# Patient Record
Sex: MALE | Race: WHITE
[De-identification: names, ages, dates, MRNs, and addresses within clinical notes are randomized per-mention and may not be internally consistent; named-entity substitution may affect disease eponyms.]

---

## 2017-01-11 NOTE — HP
HISTORY AND PHYSICAL:

 

DATE OF ADMISSION/SURGERY:  01/17/17

 

DATE OF OFFICE VISIT:  01/11/17

 

PROVIDER:  Dr. Wendy Payan. (DICTATED BY MORENA MUELLER NP)

 

CHIEF COMPLAINT:  Preop for right total knee arthroplasty.

 

HISTORY OF PRESENT ILLNESS:  Mr. Terwilliger is a 71-year-old gentleman who has 
been having increased right knee pain for a few years.  He states that at rest, 
his knee does not bother him at all, but when he moves his knee, it begins to 
hurt.  He states he ambulates using a walker.  He has failed physical therapy, 
antiinflammatories, and intraarticular joint injections.  He is now electing to 
have a total knee arthroplasty done.

 

PAST MEDICAL HISTORY:

1.  Morbid obesity.

2.  Diabetes.

3.  Hypertension.

4.  Osteoarthritis.

5.  COPD.

6.  Spinal stenosis.

7.  Cervical spondylosis.

8.  Peripheral neuropathy.

9.  Hernia.

 

PAST SURGICAL HISTORY:

1.  Left total knee arthroplasty.

2.  Cholecystectomy.

3.  Cervical spine fusion.

4.  Large toe amputation and reattachment.

 

MEDICATIONS:

1.  Clonidine 0.3 mg 1 by mouth b.i.d.

2.  Tylenol 325 mg as needed for pain.

3.  Losartan potassium 50 mg 2 by mouth every day.

4.  Levothyroxine sodium 112 mcg 1 by mouth every day.

5.  Nam aspirin low dose 81 mg 1 by mouth every day.

6.  Dutasteride 0.5 mg 1 p.o. daily.

7.  Minoxidil.

8.  Simethicone 80 mg 1 four times daily.

9.  CharcoCaps.

10.  NovoLog FlexPen 100 units per mL sliding scale.

11.  Insulin glargine human 100 units per mL.

 

ALLERGIES:  SULFA ANTIBIOTICS, LIPITOR, AMLODIPINE, SIMVASTATIN, ENALAPRIL, 
AMBIEN, BACLOFEN, PANTOPRAZOLE.

 

SOCIAL HISTORY:  The patient lives with wife.  The patient states he is a 
former smoker, smoked for 20 years, quit 30 years ago which equals to 2-pack-per
-day smoking history.  The patient states that he drinks occasionally.  The 
patient denies any illicit drug use.

 

REVIEW OF SYSTEMS:  General:  The patient denies fevers, chills, or night 
sweats. No known anesthesia problems.  HEENT:  The patient denies headaches, 
lightheadedness, or syncopal episodes.  Cardiothoracic:  The patient denies 
chest pain, heart palpitations, or edema.  Pulmonary:  The patient denies any 
shortness of breath with exertion, chronic cough, or COPD.  GI:  The patient 
denies any nausea, vomiting, diarrhea, or constipation.  :  The patient 
denies any nocturia, urinary frequency, or urinary urgency.  Musculoskeletal:  
The patient admits to pain of the right knee.  The patient denies any chronic 
or intermittent back pain. Neuro:  The patient denies any paresthesias, numbness
, seizures, stroke, or epilepsy.  Integument:  The patient denies any abrasions
, lesions, rashes, lumps, or open sores.  Endocrine:  The patient denies any 
diabetes or thyroid issues. Hematology:  The patient denies any easy bruising, 
anemia, or excessive bleeding.

 

                               PHYSICAL EXAMINATION

 

GENERAL:  The patient is awake, alert, and oriented, in no acute distress.

 

HEENT:  Normocephalic, atraumatic.  Hearing and vision are grossly intact.

 

PULMONARY:  Lungs clear to auscultation bilaterally with no wheezes, rales, or 
rhonchi.

 

CARDIO:  Regular rate and rhythm with S1 and S2.  No S3 or S4 appreciated.  No 
murmurs, rubs, or gallops.

 

ABDOMEN:  Umbilical hernia is visible.  Bowel sounds in all 4 quadrants with 
significant distention.  No palpable masses.  Nontender.

 

MUSCULOSKELETAL:  Right lower extremity:  The patient's skin is intact with 
moderate effusion of the knee with palpable tenderness to palpation along the 
anterior, medial, and lateral joint line.  Range of motion is from 15 to 90 
degrees of flexion.  No varus or valgus instability.  Negative for Lachman's.  
Distally, there is 5/5 ankle dorsiflexion and plantar flexion strength.  The 
patient seems to have a decrease in sensation to light touch in the toes in a 
stocking glove distribution.  Dorsalis pedis pulse and posterior tibial are 2+.

 

 DIAGNOSTIC STUDIES:  Multiple views of the right knee showed severe end-stage 
degenerative osteoarthritis.  There is osteophyte formation, subchondral 
sclerosis.

 

ASSESSMENT:  Preop for right total knee replacement.

 

PLAN:  The patient will return 10 to 14 days postoperatively for suture removal 
and followup.  A prescription for Percocet 5/325 has been sent to the pharmacy 
of record to be used for postoperative pain management.  A script for Coumadin 
was sent to the pharmacy for postop DVT prophylaxis.  Colace was sent to the 
pharmacy to be used as needed for constipation.  The risks and benefits of 
surgery were discussed with the patient today and he was amenable to this.  
Consent was signed.

 

 ____________________________________ DAVID MAYS

 

[id number]/[facility] #: [id number]

[email or web address]

## 2017-01-17 ENCOUNTER — HOSPITAL ENCOUNTER (INPATIENT)
Dept: HOSPITAL 25 - AA | Age: 72
LOS: 4 days | Discharge: HOME HEALTH | DRG: 470 | End: 2017-01-21
Attending: ORTHOPAEDIC SURGERY | Admitting: ORTHOPAEDIC SURGERY
Payer: MEDICARE

## 2017-01-17 DIAGNOSIS — Z88.5: ICD-10-CM

## 2017-01-17 DIAGNOSIS — J44.9: ICD-10-CM

## 2017-01-17 DIAGNOSIS — M48.00: ICD-10-CM

## 2017-01-17 DIAGNOSIS — Z98.1: ICD-10-CM

## 2017-01-17 DIAGNOSIS — Z96.652: ICD-10-CM

## 2017-01-17 DIAGNOSIS — E66.01: ICD-10-CM

## 2017-01-17 DIAGNOSIS — M17.11: Primary | ICD-10-CM

## 2017-01-17 DIAGNOSIS — M25.761: ICD-10-CM

## 2017-01-17 DIAGNOSIS — Z87.891: ICD-10-CM

## 2017-01-17 DIAGNOSIS — Z79.4: ICD-10-CM

## 2017-01-17 DIAGNOSIS — E11.65: ICD-10-CM

## 2017-01-17 DIAGNOSIS — E11.42: ICD-10-CM

## 2017-01-17 DIAGNOSIS — Z88.2: ICD-10-CM

## 2017-01-17 DIAGNOSIS — Z79.01: ICD-10-CM

## 2017-01-17 DIAGNOSIS — E03.9: ICD-10-CM

## 2017-01-17 DIAGNOSIS — Z88.8: ICD-10-CM

## 2017-01-17 PROCEDURE — 80048 BASIC METABOLIC PNL TOTAL CA: CPT

## 2017-01-17 PROCEDURE — 0SRC0J9 REPLACEMENT OF RIGHT KNEE JOINT WITH SYNTHETIC SUBSTITUTE, CEMENTED, OPEN APPROACH: ICD-10-PCS | Performed by: ORTHOPAEDIC SURGERY

## 2017-01-17 PROCEDURE — 94760 N-INVAS EAR/PLS OXIMETRY 1: CPT

## 2017-01-17 PROCEDURE — 85610 PROTHROMBIN TIME: CPT

## 2017-01-17 PROCEDURE — C1776 JOINT DEVICE (IMPLANTABLE): HCPCS

## 2017-01-17 PROCEDURE — 88311 DECALCIFY TISSUE: CPT

## 2017-01-17 PROCEDURE — 36415 COLL VENOUS BLD VENIPUNCTURE: CPT

## 2017-01-17 PROCEDURE — 85014 HEMATOCRIT: CPT

## 2017-01-17 PROCEDURE — 85025 COMPLETE CBC W/AUTO DIFF WBC: CPT

## 2017-01-17 PROCEDURE — 85018 HEMOGLOBIN: CPT

## 2017-01-17 PROCEDURE — 88305 TISSUE EXAM BY PATHOLOGIST: CPT

## 2017-01-17 RX ADMIN — FINASTERIDE SCH MG: 5 TABLET, FILM COATED ORAL at 21:34

## 2017-01-17 RX ADMIN — DOCUSATE SODIUM SCH MG: 100 CAPSULE, LIQUID FILLED ORAL at 21:34

## 2017-01-17 RX ADMIN — INSULIN GLARGINE SCH UNITS: 100 INJECTION, SOLUTION SUBCUTANEOUS at 21:36

## 2017-01-17 RX ADMIN — OXYCODONE HYDROCHLORIDE AND ACETAMINOPHEN PRN TAB: 5; 325 TABLET ORAL at 20:03

## 2017-01-17 RX ADMIN — CLONIDINE HYDROCHLORIDE SCH MG: 0.1 TABLET ORAL at 21:33

## 2017-01-17 NOTE — CONS
CONSULTATION REPORT:

 

ADDENDUM:  Mr. Terwilliger is a 71-year-old male who is currently status post 
right knee replacement with Dr. Payan.  We were consulted in regards to his 
current medical problems including diabetes, hypertension.  For further details 
of the patient's presentation, please see history and physical/consultation 
dictated by Ritu Driver on 01/17/17 with which I agree.

 

Thank you very much for consult.  We will follow the patient.

 

 [id number]/[facility] #: [id number]

MADIE

## 2017-01-17 NOTE — CONS
*** ATTENDING PHYSICIAN ADDENDUM INCLUDED ON THIS REPORT ***



CONSULTATION REPORT:

 

DATE OF CONSULTATION:  01/17/17

 

DATE OF ADMISSION:  01/17/17

 

PROVIDER:  Lilliana Baum NP

 

ATTENDING PHYSICIAN:  Dr. Baeza * (report dictated by Lilliana Baum NP)

 

PRIMARY CARE PROVIDER:  Dr. Rodrigo Pabon.

 

REFERRING PHYSICIAN:  Dr. Payan.

 

CONSULTATION REASON:  Co-medical management of comorbidities.

 

HISTORY OF PRESENT ILLNESS:  Mr. Terwilliger is a 71-year-old male with a past 
medical history of morbid obesity, insulin dependent type 2 diabetes, 
hypertension, COPD, who underwent an elective right total knee today, 01/17/17, 
with Dr. Payan. Hospital Medicine was asked to co-medical manage the patient's 
comorbidities.  The patient was seen and evaluated in the PACU at the bedside.  
The patient is alert and oriented x3, in no acute distress.  The patient 
reports "I feel great."  He denies any nausea.  Denies any pain.  The patient 
reports he underwent a left total knee several years ago and did very well.

 

The patient reports that his diabetes is well controlled as well as his 
hypertension.  He currently offers no complaints.  The patient denies any 
recent illnesses.  Denies any fever, chills, shortness of breath, or chest pain.

 

PAST MEDICAL HISTORY:

1.  Insulin dependent type 2 diabetes.

2.  Morbid obesity.

3.  Hypertension.

4.  Osteoarthritis.

5.  COPD.

6.  Spinal stenosis.

7.  Cervical spondylosis.

8.  Peripheral neuropathy.

9.  Hernia.

10.  Status post left total knee arthroplasty.

11.  Cholecystectomy.

12.  Cervical spine fusion.

13.  Large toe amputation reattachment.

14.  Hypothyroidism.

 

HOME MEDICATIONS:

1.  Clonidine 0.3 mg p.o. b.i.d.

2.  Acetaminophen 325 mg p.o. p.r.n. pain.

3.  Losartan potassium 100 mg p.o. daily.

4.  Synthroid 112 mcg p.o. daily.

5.  Aspirin 81 mg p.o. daily.

6.  Dutasteride 0.5 mg p.o. daily.

7.  Insulin Lantus 80 units injection b.i.d.

8.  NovoLog FlexPen 100 units/mL sliding scale.

 

INPATIENT MEDICATIONS:

1.  Clonidine 0.3 mg p.o. b.i.d.

2.  Acetaminophen 650 mg p.o. q.4 hours p.r.n.

3.  Albuterol HFA inhaler 2 puffs INH. q.4 hours p.r.n.

4.  Dulcolax 10 mg NC daily p.r.n.

5.  Colace 100 mg p.o. b.i.d.

6.  Lovenox 30 mg subcu q.24 hours.

7.  Insulin glargine 40 units subcu b.i.d.

8.  Insulin lispro sliding scale.

9.  Lactulose 30 mL p.o. q.6 hours p.r.n.

10.  Synthroid 112 mcg p.o. daily.

11.  Losartan 100 mg p.o. q.a.m.

12.  Milk of Magnesia 30 mL p.o. q. 6 hours p.r.n.

13.  Morphine 2 mg IV q.2 hours p.r.n.

14.  Narcan 0.08 mg IV q.2 minutes p.r.n.

15.  Zofran 4 mg IV q.6 hours p.r.n.

16.  MiraLAX 17 g p.o. daily p.r.n.

17.  Vitamin 1 tab p.o. daily.

18.  Coumadin 6 mg p.o. once.

19.  Benadryl 25 mg p.o. q.6 hours p.r.n.

20.  Oxycodone 10 mg p.o. q.4 hours p.r.n.

21.  Oxycodone/acetaminophen 1 to 2 tabs p.o. q.3 hours p.r.n.

22.  Oxycodone/acetaminophen 2 tabs p.o. q.4 hours p.r.n.

 

ALLERGIES:  AMOXICILLIN, AMLODIPINE, ATENOLOL, BACLOFEN, ENALAPRIL, SULFA 
ANTIBIOTICS, LIPITOR, SIMVASTATIN, AMBIEN, PANTOPRAZOLE, HYDROCHLOROTHIAZIDE, 
ATORVASTATIN, PAXIL, OXYBUTYNIN, SINGULAIR, GLIPIZIDE, ESOMEPRAZOLE.

 

FAMILY HISTORY:  Reviewed and noncontributory.

 

SOCIAL HISTORY:  The patient is a former smoker.  Smoked for approximately 20 
years, quitting about 30 years ago.  He reports a 2 pack per day smoking 
history. Occasional to rare alcohol use.  Denies recreational drug use.  The 
patient currently lives at home with his wife, who is his healthcare proxy.

 

REVIEW OF SYSTEMS:  A 14-point review of systems was performed.  All the 
pertinent positives and negatives are mentioned in the history of present 
illness.  All the remaining systems are negative.

 

PHYSICAL EXAMINATION:  Vital Signs:  Temperature 97.0, heart rate 60, 
respirations 16, O2 sat 98% on 2 L, blood pressure 139/63.  Appearance:  Obese 
male sitting up on the PACU stretcher.  Alert and oriented x3, in no acute 
distress.  Good historian.  Very friendly.  Appropriate to situation.  HEENT:  
Head is normocephalic, atraumatic.  Pupils are equal and reactive to light.  
Oropharynx is clear.  Dry mucous membranes.  Neck:  Supple.  No cervical or 
supraclavicular lymphadenopathy.  Cardiac:  S1, S2.  Regular rate and rhythm.  
No murmurs, rubs, or gallops appreciated.  Respiratory:  Lungs are clear to 
auscultation bilaterally. Good aeration throughout.  Abdomen:  Obese.  Normal 
bowel sounds x4.  Soft, nontender, nondistended.  Umbilical hernia is visible.  
Extremities:  Upper extremities have good strength 5/5.  In regards to the 
patient's lower extremities, the patient can wiggle his toes, has sensation in 
his toes.  His right knee has a clean, dry, and intact dressing with Cryo unit 
attached.  Neuro:  Cranial nerves II through XII are grossly intact.  Psych:  
Appropriate to situation.  Calm, appropriate.

 

ASSESSMENT AND PLAN:  Mr. Terwilliger is a 71-year-old male with a past medical 
history of morbid obesity, insulin dependent type 2 diabetes, hypertension, 
chronic obstructive pulmonary disease, previous tobacco abuse, and 
hypothyroidism, who presents to [facility] for an elective right total knee 
replacement by Dr. Payan. Hospital Medicine was asked to co-medical manage the 
patient's comorbidities.

 

1.  Status post right total knee replacement.  Disposition per Dr. Payan's 
orthopedic team.  Pain management, bowel regimen, PT, OT, monitor H and H.

2.  Insulin dependent type 2 diabetes.  Plan to decrease the patient's Lantus 
by 50%.  Per patient, he took 40 units of Lantus this morning prior to surgery.
  Plan to give the patient 40 units of Lantus this evening.  Lispro sliding 
scale with fingerstick blood glucose AC.  Consistent carb diet.

3.  Hypertension, controlled.  Continue losartan and clonidine.

4.  Hypothyroidism.  Continue Synthroid.

5.  Chronic obstructive pulmonary disease, currently not in exacerbation. 
Encouraged incentive spirometer.  Albuterol p.r.n.

6.  DVT prophylaxis.  Lovenox.

7.  Code status.  Full code.  The patient's wife is the healthcare proxy.

 

TIME SPENT:  Approximately 60 minutes were spent on this consultation.  
Hospital Medicine will continue to follow along.

 

____________________________________ LILLIANA BAUM, MATEO



ADDENDUM:  



Mr. Terwilliger is a 71-year-old male who is currently status post right knee 
replacement with Dr. Payan.  We were consulted in regards to his current 
medical problems including diabetes, hypertension.  For further details of the 
patient's presentation, please see history and physical/consultation dictated 
by Lilliana Baum on 01/17/17 with which I agree.

 

Thank you very much for consult.  We will follow the patient.



________________________________ NINI BAEZA MD

 



[id number]/[facility] #: [id number]

SADIA [id number]/[facility] #: [id number]

MADIE

## 2017-01-17 NOTE — RAD
INDICATION: Right total knee arthroplasty



COMPARISON: Preoperative x-ray dated October 28, 2016

 

TECHNIQUE: 2 view radiograph of the right knee.



FINDINGS: 



The recently placed right knee prosthesis is anatomically aligned in the AP and lateral

views. There is no evidence of periprostatic fracture. 



IMPRESSION:  Anatomically aligned right knee prosthesis.

## 2017-01-18 LAB
ANION GAP SERPL CALC-SCNC: 4 MMOL/L (ref 2–11)
BUN SERPL-MCNC: 12 MG/DL (ref 6–24)
BUN/CREAT SERPL: 13.6 (ref 8–20)
CALCIUM SERPL-MCNC: 8.7 MG/DL (ref 8.6–10.3)
CHLORIDE SERPL-SCNC: 99 MMOL/L (ref 101–111)
GLUCOSE SERPL-MCNC: 206 MG/DL (ref 70–100)
HCO3 SERPL-SCNC: 29 MMOL/L (ref 22–32)
HCT VFR BLD AUTO: 40 % (ref 42–52)
HGB BLD-MCNC: 13 G/DL (ref 14–18)
POTASSIUM SERPL-SCNC: 4.4 MMOL/L (ref 3.5–5)
SODIUM SERPL-SCNC: 132 MMOL/L (ref 133–145)

## 2017-01-18 RX ADMIN — INSULIN GLARGINE SCH UNITS: 100 INJECTION, SOLUTION SUBCUTANEOUS at 21:59

## 2017-01-18 RX ADMIN — THERA TABS SCH TAB: TAB at 09:37

## 2017-01-18 RX ADMIN — CLONIDINE HYDROCHLORIDE SCH MG: 0.1 TABLET ORAL at 09:37

## 2017-01-18 RX ADMIN — DOCUSATE SODIUM SCH MG: 100 CAPSULE, LIQUID FILLED ORAL at 21:58

## 2017-01-18 RX ADMIN — DOCUSATE SODIUM SCH MG: 100 CAPSULE, LIQUID FILLED ORAL at 09:37

## 2017-01-18 RX ADMIN — INSULIN GLARGINE SCH UNITS: 100 INJECTION, SOLUTION SUBCUTANEOUS at 09:33

## 2017-01-18 RX ADMIN — LOSARTAN POTASSIUM SCH MG: 25 TABLET, FILM COATED ORAL at 09:35

## 2017-01-18 RX ADMIN — CLONIDINE HYDROCHLORIDE SCH MG: 0.1 TABLET ORAL at 21:58

## 2017-01-18 RX ADMIN — OXYCODONE HYDROCHLORIDE AND ACETAMINOPHEN PRN TAB: 5; 325 TABLET ORAL at 05:26

## 2017-01-18 RX ADMIN — INSULIN LISPRO SCH UNIT: 100 INJECTION, SOLUTION INTRAVENOUS; SUBCUTANEOUS at 13:57

## 2017-01-18 RX ADMIN — INSULIN LISPRO SCH UNIT: 100 INJECTION, SOLUTION INTRAVENOUS; SUBCUTANEOUS at 09:32

## 2017-01-18 RX ADMIN — ENOXAPARIN SODIUM SCH MG: 30 INJECTION SUBCUTANEOUS at 14:44

## 2017-01-18 RX ADMIN — OXYCODONE HYDROCHLORIDE AND ACETAMINOPHEN PRN TAB: 5; 325 TABLET ORAL at 16:30

## 2017-01-18 RX ADMIN — FINASTERIDE SCH MG: 5 TABLET, FILM COATED ORAL at 18:43

## 2017-01-18 RX ADMIN — MORPHINE SULFATE PRN MG: 2 INJECTION, SOLUTION INTRAMUSCULAR; INTRAVENOUS at 11:35

## 2017-01-18 RX ADMIN — OXYCODONE HYDROCHLORIDE AND ACETAMINOPHEN PRN TAB: 5; 325 TABLET ORAL at 09:39

## 2017-01-18 RX ADMIN — LEVOTHYROXINE SODIUM SCH MCG: 112 TABLET ORAL at 05:26

## 2017-01-18 RX ADMIN — MORPHINE SULFATE PRN MG: 2 INJECTION, SOLUTION INTRAMUSCULAR; INTRAVENOUS at 14:40

## 2017-01-18 RX ADMIN — OXYCODONE HYDROCHLORIDE AND ACETAMINOPHEN PRN TAB: 5; 325 TABLET ORAL at 13:03

## 2017-01-18 RX ADMIN — INSULIN LISPRO SCH UNIT: 100 INJECTION, SOLUTION INTRAVENOUS; SUBCUTANEOUS at 18:42

## 2017-01-18 NOTE — PN
Subjective


Date of Service: 01/18/17


Interval History: 








pt reports he "feels pretty good today". no complaints. his knee pain is well 

controlled. Good appetite. No fevers or chills. No CP or SOB





Objective


Active Medications: 








Acetaminophen (Tylenol Tab*)  650 mg PO Q4H PRN


   PRN Reason: PAIN OR TEMPERATURE


Albuterol (Ventolin Hfa Inhaler*)  2 puff INH Q4H PRN


   PRN Reason: SHORTNESS OF BREATH


Bisacodyl (Dulcolax Supp*)  10 mg SD DAILY PRN


   PRN Reason: constipation


Clonidine HCl (Catapres Tab*)  0.3 mg PO BID [facility]


   Last Admin: 01/18/17 09:37 Dose:  0.3 mg


Dextrose (D50w Syringe 50 Ml*)  12.5 gm IV PUSH .FOR FS < 60 - SS PRN


   PRN Reason: FS < 60


   Last Admin: 01/17/17 19:19 Dose:  12.5 gm


Diphenhydramine HCl (Benadryl Iv*)  12.5 mg IV Q6H PRN


   PRN Reason: PRURITIS


Diphenhydramine HCl (Benadryl Po*)  25 mg PO Q6H PRN


   PRN Reason: itching


Docusate Sodium (Colace Cap*)  100 mg PO BID [facility]


   Last Admin: 01/18/17 09:37 Dose:  100 mg


Enoxaparin Sodium (Lovenox(*))  30 mg SUBCUT Q24H [facility]


   Last Admin: 01/18/17 14:44 Dose:  30 mg


Finasteride (Proscar Tab*)  5 mg PO QPM [facility]


   PRN Reason: Protocol


   Last Admin: 01/18/17 18:43 Dose:  5 mg


Lactated Ringer's (Lactated Ringers 1000 Ml Bag*)  1,000 mls @ 100 mls/hr IV 

PER RATE [facility]


   Last Admin: 01/18/17 15:43 Dose:  100 mls/hr


Insulin Glargine (Lantus(*))  40 units SUBCUT BID [facility]


   Last Admin: 01/18/17 09:33 Dose:  40 units


Insulin Human Lispro (Humalog*)  0 units SUBCUT AC [facility]


   PRN Reason: Protocol


   Last Admin: 01/18/17 18:42 Dose:  6 unit


Lactulose (Lactulose*)  30 ml PO Q6H PRN


   PRN Reason: constipation


Levothyroxine Sodium (Synthroid Tab*)  112 mcg PO DAILY@0600 [facility]


   Last Admin: 01/18/17 05:26 Dose:  112 mcg


Losartan Potassium (Cozaar Tab*)  100 mg PO QAM [facility]


   Last Admin: 01/18/17 09:35 Dose:  100 mg


Magnesium Hydroxide (Milk Of Magnesia Liq*)  30 ml PO Q6H PRN


   PRN Reason: constipation


Morphine Sulfate (Morphine Inj (Syringe)*)  2 mg IV Q2H PRN


   PRN Reason: PAIN


   Last Admin: 01/18/17 14:40 Dose:  2 mg


Multivitamins (Theragran Tab*)  1 tab PO DAILY [facility]


   Last Admin: 01/18/17 09:37 Dose:  1 tab


Ondansetron HCl (Zofran Inj*)  4 mg IV Q6H PRN


   PRN Reason: nausea


Ondansetron HCl (Zofran Tab*)  4 mg PO Q6H PRN


   PRN Reason: NAUSEA


Oxycodone HCl (Roxycodone Tab*)  10 mg PO Q4H PRN


   PRN Reason: SEVERE PAIN


Oxycodone/Acetaminophen (Percocet 5/325 Tab*)  1 tab PO Q3H PRN


   PRN Reason: PAIN - MODERATE


Oxycodone/Acetaminophen (Percocet 5/325 Tab*)  2 tab PO Q3H PRN


   PRN Reason: PAIN - MODERATE


   Last Admin: 01/18/17 16:30 Dose:  2 tab


Polyethylene Glycol/Electrolytes (Miralax*)  17 gm PO DAILY PRN


   PRN Reason: Constipation


Simethicone (Mylicon*)  80 mg PO Q6H PRN


   PRN Reason: flatulence


   Last Admin: 01/18/17 13:03 Dose:  80 mg








 Vital Signs











  01/17/17 01/17/17 01/17/17





  20:30 20:50 21:00


 


Temperature 99.1 F 98.8 F 98.8 F


 


Pulse Rate 106 111 111


 


Respiratory 18 18 18





Rate   


 


Blood Pressure 159/60 154/59 154/59





(mmHg)   


 


O2 Sat by Pulse 95 91 91





Oximetry   














  01/17/17 01/17/17 01/17/17





  21:04 21:13 22:17


 


Temperature   99.0 F


 


Pulse Rate   106


 


Respiratory 18 18 20





Rate   


 


Blood Pressure   154/48





(mmHg)   


 


O2 Sat by Pulse   95





Oximetry   














  01/17/17 01/17/17 01/18/17





  23:07 23:53 00:56


 


Temperature  98.8 F 99.1 F


 


Pulse Rate  102 104


 


Respiratory  16 16





Rate   


 


Blood Pressure  159/62 133/58





(mmHg)   


 


O2 Sat by Pulse 93 94 94





Oximetry   














  01/18/17 01/18/17 01/18/17





  03:20 05:26 07:26


 


Temperature 98.3 F  


 


Pulse Rate 107  


 


Respiratory 16 18 18





Rate   


 


Blood Pressure 143/49  





(mmHg)   


 


O2 Sat by Pulse 96  





Oximetry   














  01/18/17 01/18/17 01/18/17





  07:35 07:38 09:39


 


Temperature 98.4 F  


 


Pulse Rate 92  


 


Respiratory 18 18 18





Rate   


 


Blood Pressure 135/51  





(mmHg)   


 


O2 Sat by Pulse 94 94 





Oximetry   














  01/18/17 01/18/17 01/18/17





  11:34 11:35 11:39


 


Temperature 97.9 F  


 


Pulse Rate 97  


 


Respiratory 17 18 16





Rate   


 


Blood Pressure 156/59  





(mmHg)   


 


O2 Sat by Pulse 97  





Oximetry   














  01/18/17 01/18/17 01/18/17





  12:35 13:03 14:40


 


Temperature   


 


Pulse Rate   


 


Respiratory 18 16 18





Rate   


 


Blood Pressure   





(mmHg)   


 


O2 Sat by Pulse   





Oximetry   














  01/18/17 01/18/17 01/18/17





  15:03 15:26 15:27


 


Temperature  98.3 F 


 


Pulse Rate  103 


 


Respiratory 18 18 





Rate   


 


Blood Pressure  146/52 





(mmHg)   


 


O2 Sat by Pulse  85 92





Oximetry   














  01/18/17 01/18/17 01/18/17





  15:40 16:30 18:30


 


Temperature   


 


Pulse Rate   


 


Respiratory 18 18 18





Rate   


 


Blood Pressure   





(mmHg)   


 


O2 Sat by Pulse   





Oximetry   














  01/18/17





  18:54


 


Temperature 


 


Pulse Rate 


 


Respiratory 





Rate 


 


Blood Pressure 





(mmHg) 


 


O2 Sat by Pulse 92





Oximetry 











Oxygen Devices in Use Now: None


Appearance: obese male A+O x3 sitting up on the side of the bed in NAD. wife at 

bedside


Eyes: No Scleral Icterus, PERRLA


Ears/Nose/Mouth/Throat: NL Teeth, Lips, Gums, Mucous Membranes Moist


Neck: NL Appearance and Movements; NL JVP


Respiratory: Symmetrical Chest Expansion and Respiratory Effort, Clear to 

Auscultation


Cardiovascular: NL Sounds; No Murmurs; No JVD, RRR, No Edema


Abdominal: NL Sounds; No Tenderness; No Distention


Lymphatic: No Cervical Adenopathy


Extremities: - - right knee wih CD+I dressing with cryo unit


Skin: No Rash or Ulcers


Neurological: Alert and Oriented x 3, NL Sensation, NL Muscle Strength and Tone


Lines/Tubes/Other Access: Clean, Dry and Intact Peripheral IV


Nutrition: Taking PO's


Result Diagrams: 


 01/18/17 06:05





 01/18/17 06:05





Assess/Plan/Problems-Billing


Assessment: 











- Patient Problems


(1) Status post total right knee replacement


Comment: 


POD #1 


Dispo per Ortho


Pain management


HH stable   





(2) Diabetes


Comment: 


- FSBG AC. Resume home Lantus 80 units BID and continue lispro SS   





(3) HTN (hypertension)


Comment: 


- stable. continue home meds.    





(4) Hypothyroid


Comment: 


- continue synthroid   





(5) DVT prophylaxis


Comment: coumaidn and lovenox   





(6) Full code status

## 2017-01-18 NOTE — PN
Progress Note





- Progress Note


SOAP: 


Subjective:


Pt. reports r knee is painful and stiff/weak.  BG labile postop.  








Objective:


RLE - dressing c/d/i.  distally +df/pf, full sens lt, 2+ dp pulse.  





Vital Signs:











Temp Pulse Resp BP Pulse Ox


 


 98.4 F   92   18   135/51   94 


 


 01/18/17 07:35  01/18/17 07:35  01/18/17 07:35  01/18/17 07:35  01/18/17 07:35











 Laboratory Results - last 24 hr











  01/17/17 01/17/17 01/17/17





  11:58 18:23 18:26


 


Hgb   


 


Hct   


 


INR (Anticoag Therapy)   


 


Sodium   


 


Potassium   


 


Chloride   


 


Carbon Dioxide   


 


Anion Gap   


 


BUN   


 


Creatinine   


 


Est GFR ( Amer)   


 


Est GFR (Non-Af Amer)   


 


BUN/Creatinine Ratio   


 


Glucose   


 


POC Glucose (mg/dL)  133 H  60 L  72 L


 


Calcium   














  01/17/17 01/17/17 01/17/17





  19:03 19:17 19:26


 


Hgb   


 


Hct   


 


INR (Anticoag Therapy)   


 


Sodium   


 


Potassium   


 


Chloride   


 


Carbon Dioxide   


 


Anion Gap   


 


BUN   


 


Creatinine   


 


Est GFR ( Amer)   


 


Est GFR (Non-Af Amer)   


 


BUN/Creatinine Ratio   


 


Glucose   


 


POC Glucose (mg/dL)  71 L  62 L  134 H


 


Calcium   














  01/17/17 01/17/17 01/17/17





  19:37 20:36 22:45


 


Hgb   


 


Hct   


 


INR (Anticoag Therapy)   


 


Sodium   


 


Potassium   


 


Chloride   


 


Carbon Dioxide   


 


Anion Gap   


 


BUN   


 


Creatinine   


 


Est GFR ( Amer)   


 


Est GFR (Non-Af Amer)   


 


BUN/Creatinine Ratio   


 


Glucose   


 


POC Glucose (mg/dL)  123 H  165 H  195 H


 


Calcium   














  01/18/17 01/18/17 01/18/17





  06:05 06:05 06:05


 


Hgb  13.0 L  


 


Hct  40 L  


 


INR (Anticoag Therapy)   1.06 


 


Sodium    132 L


 


Potassium    4.4


 


Chloride    99 L


 


Carbon Dioxide    29


 


Anion Gap    4


 


BUN    12


 


Creatinine    0.88


 


Est GFR ( Amer)    110.1


 


Est GFR (Non-Af Amer)    85.6


 


BUN/Creatinine Ratio    13.6


 


Glucose    206 H


 


POC Glucose (mg/dL)   


 


Calcium    8.7














  01/18/17





  07:28


 


Hgb 


 


Hct 


 


INR (Anticoag Therapy) 


 


Sodium 


 


Potassium 


 


Chloride 


 


Carbon Dioxide 


 


Anion Gap 


 


BUN 


 


Creatinine 


 


Est GFR ( Amer) 


 


Est GFR (Non-Af Amer) 


 


BUN/Creatinine Ratio 


 


Glucose 


 


POC Glucose (mg/dL)  236 H


 


Calcium 




















Assessment:


72 yo M pod 1 s/p RTKA








Plan:


Appreciate Medicine team diabetic management


wbat RLE 


PT/OT


8 mg coumadin tonight, start lovenox per protocol when spinal removed 


carvalho d/c this am

## 2017-01-18 NOTE — OP
OPERATIVE REPORT:

 

DATE OF OPERATION:  01/17/17

 

DATE OF BIRTH:  [birth year]

 

SURGEON:  Wendy Payan MD

 

ANESTHESIOLOGIST:  Jose Orr MD

 

ANESTHESIA:  Spinal.

 

PRE-OP DIAGNOSIS:  Severe end-stage degenerative osteoarthritis of the right knee joint.

 

POST-OP DIAGNOSIS:  Severe end-stage degenerative osteoarthritis of the right knee joint.

 

OPERATIVE PROCEDURE:  Right total knee arthroplasty.

 

INDICATIONS:  Mr. Terwilliger is a 71-year-old gentleman with years of increasingly severe right kne
e pain.  He failed conservative treatment with antiinflammatories, pain medications, intraarticular 
injections, and physical therapy.  He elected to undergo right total knee arthroplasty due to contin
ued pain and decreased quality of life.  Radiographs and physical exam confirmed bone-on-bone severe
 end-stage arthritis of the right knee joint.

 

Informed consent was obtained from the patient.  He understood the risks of the procedure included, 
but were not limited to, bleeding, infection, damage to nearby structures, continued pain, need for 
further surgery, intraoperative fracture, nerve palsy, hardware failure or loosening, stroke, heart 
attack, blood clot, and death.  He wished to proceed.

 

TOURNIQUET TIME:  60 minutes.

 

COMPLICATIONS:  None.

 

ESTIMATED BLOOD LOSS:  200 cc.

 

SPECIMEN:  Bone and cartilage from the right knee joint sent to Pathology.

 

HARDWARE:  This is [employer] cemented total knee hardware.  For the cement, 2 packages of Sim
plex bone cement.  For the femur, a size 7 right femoral component.  For the tibia, a size 7 right t
ibial baseplate and an 11-mm posterior stabilized insert, and for the patella, 38, 3-peg all-poly pa
tella.

 

INTRAOPERATIVE FINDINGS:  Intraoperatively, the patient was noted to have 10-degree flexion contract
ure preoperatively.  Postop range of motion was full extension to 120 degrees of flexion limited by 
morbid obese body habitus.  The patient was noted to have severe full thickness cartilage loss in th
e medial and patellofemoral compartments.

 

DESCRIPTION OF PROCEDURE:  Mr. Terwilliger was identified in the preanesthesia unit.  His right lowe
r extremity was marked as the correct operative side. Informed consent was signed and placed in the 
the chart.  The patient was taken to the operating room and placed under spinal anesthesia.  A Antoine
 catheter was placed.  Tourniquet was placed on the right side.  The right lower extremity was prepp
ed and draped in the usual sterile fashion.  Preop time-out was made to correctly identify the patie
nt, side, and site.  Appropriate preoperative antibiotics were given within 1 hour of incision.

 

Tourniquet was inflated until the tourniquet time for this procedure was 60 minutes.  A 10 blade was
 used to make a 14-cm midline incision.  This was carried down to the extensor mechanism.  A new 10 
blade was used to make a standard medial parapatellar arthrotomy.  The patella was subluxed laterall
y.  Soft tissue was subperiosteally elevated along the superomedial tibia to the mid sagittal plane 
using electrocautery.  The knee was flexed up.  Anterior horn of the lateral meniscus and ACL were s
harply released.  A drill was used to enter the distal femur.  Intramedullary distal femoral cutting
 guide was placed and pinned into proper position.  Oscillating saw was used to make the appropriate
 distal femoral cut.  An external rotation guide was then pinned on the distal femur and the femur w
as sized to a size 7.  Size 7 multi-cutting jig was pinned on the distal femur and oscillating saw w
as used to make the appropriate 4-chamfer cuts.  All bony fragments were carefully removed.

 

The PCL was completely released.  Tibia was subluxed anteriorly.  The extramedullary tibial cutting 
guide was pinned in to proper position on the proximal tibia.  Oscillating saw was used to make a pr
oximal tibial cut perpendicular to the mechanical axis of the tibia.  The bone was carefully removed
. The knee was brought out into full extension and there was some medial ligament tightness.  Electr
ocautery was used to perform a conservative release here.  Medial and lateral ligamentous balancing 
were improved.  Flexion and extension gaps were noted to be well balanced.  The knee was out in full
 extension with the spacer blocks fitting nicely.

 

The knee was flexed up.  Lamina  was placed both medially and laterally. Any remaining menis
cus was carefully excised using electrocautery.  Posterior femoral condyles had any osteophytes allan
khanh using a curved osteotome and curette.

 

Next, a size 7 femoral trial side right was impacted on to the distal femur.  This implant trial had
 good fit and stability.  The box for the posterior stabilized implant was prepared using a reamer a
nd box-cut osteotome.  Trial 7 tibial tray and 9-mm insert trial were placed.  The knee was taken th
rough a range of motion and noted to have full extension to 120 degrees of flexion.  Good patellofem
oral tracking.  The patella was everted.  9 mm of patellar bone and cartilage were carefully removed
 using an oscillating saw.  The patella was sized to a size 38. Three drill holes were drilled throu
gh the patella using a size 38 guide.  The 38 trial was placed and the knee was taken through range 
of motion.  The patellofemoral tracking was satisfactory.

 

All trials were carefully removed.  The tibia was subluxed anteriorly and sized to a size 7.  Proxim
al tibia was prepared using a keel punch.  All bony cut surfaces were copiously irrigated with steri
le saline and dried.  The final implants were cemented in place starting with the tibia followed by 
the femur and lastly the patella.  An 11-mm insert trial was placed while the knee was brought out i
nto full extension.  The cement was allowed to fully cure and the tourniquet was turned down.  Once 
the cement was fully cured, the capsule was checked for any bleeding or excess cement.  Final implan
t chosen was an 11-mm posterior stabilized articular insert.  This was locked into the position on t
he tibial tray.  Stability of the insert was checked and rechecked and noted to be stable.

 

Final range of motion with full extension to 120 degrees of flexion with good patellofemoral trackin
g.  The knee was copiously irrigated with sterile saline. The extensor mechanism was closed using in
terrupted #1 Vicryl's.  The rest of the incision was closed in a layered fashion using 0 and 2-0 Ronald
ryl's.  Skin was closed using running 3-0 Vicryl suture.  Sterile Xeroform, 4x4's, and Webril were u
sed to cover the incision.  Ace wrap and cold packs were placed over this. The patient's anesthesia 
was reversed without difficulty.  He was taken to the PACU in stable condition.  Intended weightbear
ing will be weightbearing as tolerated. Intended DVT prophylaxis will be Coumadin with a Lovenox bryn
dge.

 

 [id number]/CPS #: [id number]

## 2017-01-19 LAB
ANION GAP SERPL CALC-SCNC: 5 MMOL/L (ref 2–11)
BUN SERPL-MCNC: 15 MG/DL (ref 6–24)
BUN/CREAT SERPL: 15.5 (ref 8–20)
CALCIUM SERPL-MCNC: 9.5 MG/DL (ref 8.6–10.3)
CHLORIDE SERPL-SCNC: 98 MMOL/L (ref 101–111)
GLUCOSE SERPL-MCNC: 204 MG/DL (ref 70–100)
HCO3 SERPL-SCNC: 29 MMOL/L (ref 22–32)
HCT VFR BLD AUTO: 38 % (ref 42–52)
HGB BLD-MCNC: 12.4 G/DL (ref 14–18)
MCH RBC QN AUTO: 30 PG (ref 27–31)
MCHC RBC AUTO-ENTMCNC: 33 G/DL (ref 31–36)
MCV RBC AUTO: 89 FL (ref 80–94)
POTASSIUM SERPL-SCNC: 4.5 MMOL/L (ref 3.5–5)
RBC # BLD AUTO: 4.2 10^6/UL (ref 4–5.4)
SODIUM SERPL-SCNC: 132 MMOL/L (ref 133–145)
WBC # BLD AUTO: 12.5 10^3/UL (ref 3.5–10.8)

## 2017-01-19 RX ADMIN — DOCUSATE SODIUM SCH MG: 100 CAPSULE, LIQUID FILLED ORAL at 08:37

## 2017-01-19 RX ADMIN — INSULIN LISPRO SCH UNIT: 100 INJECTION, SOLUTION INTRAVENOUS; SUBCUTANEOUS at 12:52

## 2017-01-19 RX ADMIN — INSULIN GLARGINE SCH UNITS: 100 INJECTION, SOLUTION SUBCUTANEOUS at 21:44

## 2017-01-19 RX ADMIN — OXYCODONE HYDROCHLORIDE AND ACETAMINOPHEN PRN TAB: 5; 325 TABLET ORAL at 06:30

## 2017-01-19 RX ADMIN — INSULIN LISPRO SCH UNIT: 100 INJECTION, SOLUTION INTRAVENOUS; SUBCUTANEOUS at 08:36

## 2017-01-19 RX ADMIN — CLONIDINE HYDROCHLORIDE SCH MG: 0.1 TABLET ORAL at 08:37

## 2017-01-19 RX ADMIN — THERA TABS SCH TAB: TAB at 08:37

## 2017-01-19 RX ADMIN — CLONIDINE HYDROCHLORIDE SCH MG: 0.1 TABLET ORAL at 21:45

## 2017-01-19 RX ADMIN — ENOXAPARIN SODIUM SCH MG: 30 INJECTION SUBCUTANEOUS at 15:20

## 2017-01-19 RX ADMIN — INSULIN GLARGINE SCH UNITS: 100 INJECTION, SOLUTION SUBCUTANEOUS at 08:35

## 2017-01-19 RX ADMIN — LOSARTAN POTASSIUM SCH MG: 25 TABLET, FILM COATED ORAL at 08:37

## 2017-01-19 RX ADMIN — LEVOTHYROXINE SODIUM SCH MCG: 112 TABLET ORAL at 06:30

## 2017-01-19 RX ADMIN — FINASTERIDE SCH MG: 5 TABLET, FILM COATED ORAL at 17:49

## 2017-01-19 RX ADMIN — INSULIN LISPRO SCH UNITS: 100 INJECTION, SOLUTION INTRAVENOUS; SUBCUTANEOUS at 17:49

## 2017-01-19 RX ADMIN — DOCUSATE SODIUM SCH MG: 100 CAPSULE, LIQUID FILLED ORAL at 21:46

## 2017-01-19 RX ADMIN — OXYCODONE HYDROCHLORIDE AND ACETAMINOPHEN PRN TAB: 5; 325 TABLET ORAL at 11:31

## 2017-01-19 RX ADMIN — INSULIN LISPRO SCH UNITS: 100 INJECTION, SOLUTION INTRAVENOUS; SUBCUTANEOUS at 21:43

## 2017-01-19 RX ADMIN — OXYCODONE HYDROCHLORIDE AND ACETAMINOPHEN PRN TAB: 5; 325 TABLET ORAL at 02:00

## 2017-01-19 NOTE — PN
Progress Note





- Progress Note


Note: 





ANESTHESIOLOGY FOLLOW-UP





Patient is alert, sitting in chair.


He denies back pain or headache.


Satisfactory recover after (continuous) spinal anesthesia.

## 2017-01-19 NOTE — PN
Subjective


Date of Service: 01/19/17


Interval History: 








Pt reports pain in right knee today. He reports its manageable but feels like 

its hard to ambulate. reports high blood sugars. no SOB or CP. No fevers 

chills. tolerating PO, no N/V/D. 





 





Objective


Active Medications: 








Acetaminophen (Tylenol Tab*)  650 mg PO Q4H PRN


   PRN Reason: PAIN OR TEMPERATURE


Albuterol (Ventolin Hfa Inhaler*)  2 puff INH Q4H PRN


   PRN Reason: SHORTNESS OF BREATH


Bisacodyl (Dulcolax Supp*)  10 mg OK DAILY PRN


   PRN Reason: constipation


Clonidine HCl (Catapres Tab*)  0.3 mg PO BID [facility]


   Last Admin: 01/19/17 08:37 Dose:  0.3 mg


Dextrose (D50w Syringe 50 Ml*)  12.5 gm IV PUSH .FOR FS < 60 - SS PRN


   PRN Reason: FS < 60


   Last Admin: 01/17/17 19:19 Dose:  12.5 gm


Diphenhydramine HCl (Benadryl Iv*)  12.5 mg IV Q6H PRN


   PRN Reason: PRURITIS


Diphenhydramine HCl (Benadryl Po*)  25 mg PO Q6H PRN


   PRN Reason: itching


Docusate Sodium (Colace Cap*)  100 mg PO BID [facility]


   Last Admin: 01/19/17 08:37 Dose:  100 mg


Enoxaparin Sodium (Lovenox(*))  30 mg SUBCUT Q24H [facility]


   Last Admin: 01/18/17 14:44 Dose:  30 mg


Finasteride (Proscar Tab*)  5 mg PO QPM [facility]


   PRN Reason: Protocol


   Last Admin: 01/18/17 18:43 Dose:  5 mg


Lactated Ringer's (Lactated Ringers 1000 Ml Bag*)  1,000 mls @ 100 mls/hr IV 

PER RATE [facility]


   Last Admin: 01/18/17 15:43 Dose:  100 mls/hr


Insulin Glargine (Lantus(*))  80 units SUBCUT BID [facility]


   Last Admin: 01/19/17 08:35 Dose:  80 units


Insulin Human Lispro (Humalog*)  0 units SUBCUT AC [facility]


   PRN Reason: Protocol


   Last Admin: 01/19/17 12:52 Dose:  9 unit


Lactulose (Lactulose*)  30 ml PO Q6H PRN


   PRN Reason: constipation


Levothyroxine Sodium (Synthroid Tab*)  112 mcg PO DAILY@0600 [facility]


   Last Admin: 01/19/17 06:30 Dose:  112 mcg


Losartan Potassium (Cozaar Tab*)  100 mg PO QAM [facility]


   Last Admin: 01/19/17 08:37 Dose:  100 mg


Magnesium Hydroxide (Milk Of Magnesia Liq*)  30 ml PO Q6H PRN


   PRN Reason: constipation


Morphine Sulfate (Morphine Inj (Syringe)*)  2 mg IV Q2H PRN


   PRN Reason: PAIN


   Last Admin: 01/18/17 14:40 Dose:  2 mg


Multivitamins (Theragran Tab*)  1 tab PO DAILY [facility]


   Last Admin: 01/19/17 08:37 Dose:  1 tab


Ondansetron HCl (Zofran Inj*)  4 mg IV Q6H PRN


   PRN Reason: nausea


Ondansetron HCl (Zofran Tab*)  4 mg PO Q6H PRN


   PRN Reason: NAUSEA


Oxycodone HCl (Roxycodone Tab*)  10 mg PO Q4H PRN


   PRN Reason: SEVERE PAIN


   Last Admin: 01/18/17 21:56 Dose:  10 mg


Oxycodone/Acetaminophen (Percocet 5/325 Tab*)  1 tab PO Q3H PRN


   PRN Reason: PAIN - MODERATE


Oxycodone/Acetaminophen (Percocet 5/325 Tab*)  2 tab PO Q3H PRN


   PRN Reason: PAIN - MODERATE


   Last Admin: 01/19/17 11:31 Dose:  2 tab


Pharmacy Profile Note (Coumadin Daily Reminder*)  1 note FOLLOW UP 1700 [facility]


Polyethylene Glycol/Electrolytes (Miralax*)  17 gm PO DAILY PRN


   PRN Reason: Constipation


Simethicone (Mylicon*)  80 mg PO Q6H PRN


   PRN Reason: flatulence


   Last Admin: 01/18/17 13:03 Dose:  80 mg


Warfarin Sodium (Coumadin Tab(*))  8 mg PO ONCE@1700 ONE


   PRN Reason: Protocol


   Stop: 01/19/17 17:01








 Vital Signs











  01/19/17 01/19/17 01/19/17





  05:21 06:30 07:29


 


Temperature   98.4 F


 


Pulse Rate   91


 


Respiratory  20 24





Rate   


 


Blood Pressure   





(mmHg)   


 


O2 Sat by Pulse 92  95





Oximetry   














  01/19/17 01/19/17 01/19/17





  08:00 08:30 11:31


 


Temperature   98.5 F


 


Pulse Rate   103


 


Respiratory 18 18 20





Rate   


 


Blood Pressure   148/58





(mmHg)   


 


O2 Sat by Pulse   91





Oximetry   














  01/19/17





  13:31


 


Temperature 


 


Pulse Rate 


 


Respiratory 18





Rate 


 


Blood Pressure 





(mmHg) 


 


O2 Sat by Pulse 





Oximetry 











Oxygen Devices in Use Now: None


Appearance: 70 yo obese male sitting up in a chair in NAD. A+O x3


Eyes: No Scleral Icterus, PERRLA


Ears/Nose/Mouth/Throat: NL Teeth, Lips, Gums, Mucous Membranes Moist


Neck: NL Appearance and Movements; NL JVP


Respiratory: Symmetrical Chest Expansion and Respiratory Effort, Clear to 

Auscultation


Cardiovascular: NL Sounds; No Murmurs; No JVD, RRR, No Edema


Abdominal: NL Sounds; No Tenderness; No Distention, - - obese


Extremities: - - right knee with dressing/ace wrap with cryo unit - good 

sensation proximal and distal - skin warm, pink, dry


Skin: No Rash or Ulcers, No Nodules or Sclerosis


Neurological: Alert and Oriented x 3, NL Sensation, NL Muscle Strength and Tone


Lines/Tubes/Other Access: Clean, Dry and Intact Peripheral IV


Nutrition: Taking PO's


Result Diagrams: 


 01/19/17 06:01





 01/19/17 06:01





Assess/Plan/Problems-Billing


Assessment: 70 yo male with a PMH of morbid obesity, IDDM-2, HTN, COPD, 

hypothyroidism who underwent an elective Right TKR on 1/17/17. 











- Patient Problems


(1) Status post total right knee replacement


Comment: 


POD #2 


Dispo per Ortho


Pain management


HH stable


PT/OT   





(2) Diabetes


Comment: 


- Continues to have hypergylcemia; pt changed back to home Lantus 80 units BID 

and continue lispro SS. Change FSBG to ACHS.   





(3) HTN (hypertension)


Comment: 


- stable. continue home meds.    





(4) Hypothyroid


Comment: 


- continue synthroid   





(5) DVT prophylaxis


Comment: lovenox bridge to coumadin   





(6) Full code status





Status and Disposition: 





inpatient. DIspo per Ortho. Hospital Medicine co-medical managing co-morbidities

## 2017-01-19 NOTE — PN
Progress Note





- Progress Note


SOAP: 


Subjective:


[Pt was seen this morning while laying in bed. Pt states that he is doing much 

better than yesterday. States that his pain is really coming down and that he 

is hoping it will continue on that path. Pt states he is taking his pain meds 

as he needs them. Pt denies any SOB, CP or calf pain ]








Objective:


[General: pt is alert, awake and oriented. Appears in no acute distress


MSK: RLE: Pts dressing was changed today. Old dressing had minimal amount of 

dried blood. No other drainage noted. Incision is clean, dry and intact. No 

discharge from incision. Pt is able to dorsiflex and planterflex. Able to 

wiggle toes. Pt has 2+ dp and PT. Pt is neurovascularly intact distally to 

light touch. ]





 Vital Signs











Temp  98.4 F   01/19/17 07:29


 


Pulse  91   01/19/17 07:29


 


Resp  18   01/19/17 08:30


 


BP  117/41   01/19/17 03:41


 


Pulse Ox  95   01/19/17 07:29








 Intake & Output











 01/18/17 01/19/17 01/19/17





 18:59 06:59 18:59


 


Intake Total 1512 640 250


 


Output Total 1625 125 550


 


Balance -113 515 -300


 


Intake:   


 


  IV Fluids 922 0 


 


    ABX - CLINDAMYCIN  0 


 


      


 


  IVPB 150  


 


    ABX - CLINDAMYCIN 150  


 


  Oral 440 640 250


 


Output:   


 


  Urine  125 550


 


  Antoine 1625  


 


Other:   


 


  Estimated Void  Large 


 


  # Bowel Movements  0 


 


  # Voids  1 














Assessment:


[POD#2 RTKA]








Plan:


[Pt had dressing changed today. 


Pt will continue PT/OT 


Pt will continue current pain management regiment. 


Appreciate Medicine team diabetic management


wbat RLE 


PT/OT


6mg of coumadin tonight. 


]

## 2017-01-20 LAB
ANION GAP SERPL CALC-SCNC: 5 MMOL/L (ref 2–11)
BUN SERPL-MCNC: 17 MG/DL (ref 6–24)
BUN/CREAT SERPL: 18.9 (ref 8–20)
CALCIUM SERPL-MCNC: 9.2 MG/DL (ref 8.6–10.3)
CHLORIDE SERPL-SCNC: 98 MMOL/L (ref 101–111)
GLUCOSE SERPL-MCNC: 150 MG/DL (ref 70–100)
HCO3 SERPL-SCNC: 29 MMOL/L (ref 22–32)
HCT VFR BLD AUTO: 36 % (ref 42–52)
HGB BLD-MCNC: 11.9 G/DL (ref 14–18)
MCH RBC QN AUTO: 29 PG (ref 27–31)
MCHC RBC AUTO-ENTMCNC: 33 G/DL (ref 31–36)
MCV RBC AUTO: 89 FL (ref 80–94)
POTASSIUM SERPL-SCNC: 4.1 MMOL/L (ref 3.5–5)
RBC # BLD AUTO: 4.06 10^6/UL (ref 4–5.4)
SODIUM SERPL-SCNC: 132 MMOL/L (ref 133–145)
WBC # BLD AUTO: 12.2 10^3/UL (ref 3.5–10.8)

## 2017-01-20 RX ADMIN — INSULIN LISPRO SCH UNITS: 100 INJECTION, SOLUTION INTRAVENOUS; SUBCUTANEOUS at 21:28

## 2017-01-20 RX ADMIN — LEVOTHYROXINE SODIUM SCH MCG: 112 TABLET ORAL at 05:54

## 2017-01-20 RX ADMIN — ENOXAPARIN SODIUM SCH MG: 30 INJECTION SUBCUTANEOUS at 15:29

## 2017-01-20 RX ADMIN — OXYCODONE HYDROCHLORIDE AND ACETAMINOPHEN PRN TAB: 5; 325 TABLET ORAL at 15:27

## 2017-01-20 RX ADMIN — INSULIN LISPRO SCH UNITS: 100 INJECTION, SOLUTION INTRAVENOUS; SUBCUTANEOUS at 18:03

## 2017-01-20 RX ADMIN — OXYCODONE HYDROCHLORIDE AND ACETAMINOPHEN PRN TAB: 5; 325 TABLET ORAL at 09:45

## 2017-01-20 RX ADMIN — INSULIN GLARGINE SCH UNITS: 100 INJECTION, SOLUTION SUBCUTANEOUS at 09:39

## 2017-01-20 RX ADMIN — INSULIN LISPRO SCH UNITS: 100 INJECTION, SOLUTION INTRAVENOUS; SUBCUTANEOUS at 09:40

## 2017-01-20 RX ADMIN — CLONIDINE HYDROCHLORIDE SCH MG: 0.1 TABLET ORAL at 21:09

## 2017-01-20 RX ADMIN — LOSARTAN POTASSIUM SCH MG: 25 TABLET, FILM COATED ORAL at 09:38

## 2017-01-20 RX ADMIN — DOCUSATE SODIUM SCH MG: 100 CAPSULE, LIQUID FILLED ORAL at 09:39

## 2017-01-20 RX ADMIN — OXYCODONE HYDROCHLORIDE AND ACETAMINOPHEN PRN TAB: 5; 325 TABLET ORAL at 21:09

## 2017-01-20 RX ADMIN — INSULIN LISPRO SCH UNITS: 100 INJECTION, SOLUTION INTRAVENOUS; SUBCUTANEOUS at 13:21

## 2017-01-20 RX ADMIN — FINASTERIDE SCH MG: 5 TABLET, FILM COATED ORAL at 18:03

## 2017-01-20 RX ADMIN — THERA TABS SCH TAB: TAB at 09:38

## 2017-01-20 RX ADMIN — DOCUSATE SODIUM SCH MG: 100 CAPSULE, LIQUID FILLED ORAL at 21:09

## 2017-01-20 RX ADMIN — INSULIN GLARGINE SCH UNITS: 100 INJECTION, SOLUTION SUBCUTANEOUS at 21:11

## 2017-01-20 RX ADMIN — CLONIDINE HYDROCHLORIDE SCH MG: 0.1 TABLET ORAL at 09:38

## 2017-01-20 NOTE — PN
Subjective


Date of Service: 01/20/17


Interval History: 








patient reports he feels better today, less pain and feels that he can move 

around better today. No SOB or CP. No fevers or chills





Objective


Active Medications: 








Acetaminophen (Tylenol Tab*)  650 mg PO Q4H PRN


   PRN Reason: PAIN OR TEMPERATURE


   Last Admin: 01/19/17 23:58 Dose:  650 mg


Albuterol (Ventolin Hfa Inhaler*)  2 puff INH Q4H PRN


   PRN Reason: SHORTNESS OF BREATH


Bisacodyl (Dulcolax Supp*)  10 mg RI DAILY PRN


   PRN Reason: constipation


Clonidine HCl (Catapres Tab*)  0.3 mg PO BID [facility]


   Last Admin: 01/20/17 09:38 Dose:  0.3 mg


Dextrose (D50w Syringe 50 Ml*)  12.5 gm IV PUSH .FOR FS < 60 - SS PRN


   PRN Reason: FS < 60


   Last Admin: 01/17/17 19:19 Dose:  12.5 gm


Diphenhydramine HCl (Benadryl Iv*)  12.5 mg IV Q6H PRN


   PRN Reason: PRURITIS


Diphenhydramine HCl (Benadryl Po*)  25 mg PO Q6H PRN


   PRN Reason: itching


Docusate Sodium (Colace Cap*)  100 mg PO BID [facility]


   Last Admin: 01/20/17 09:39 Dose:  100 mg


Enoxaparin Sodium (Lovenox(*))  30 mg SUBCUT Q24H [facility]


   Last Admin: 01/19/17 15:20 Dose:  30 mg


Finasteride (Proscar Tab*)  5 mg PO QPM [facility]


   PRN Reason: Protocol


   Last Admin: 01/19/17 17:49 Dose:  5 mg


Lactated Ringer's (Lactated Ringers 1000 Ml Bag*)  1,000 mls @ 100 mls/hr IV 

PER RATE [facility]


   Last Admin: 01/18/17 15:43 Dose:  100 mls/hr


Insulin Glargine (Lantus(*))  80 units SUBCUT BID [facility]


   Last Admin: 01/20/17 09:39 Dose:  80 units


Insulin Human Lispro (Humalog*)  0 units SUBCUT ACHS [facility]


   PRN Reason: Protocol


   Last Admin: 01/20/17 13:21 Dose:  6 units


Lactulose (Lactulose*)  30 ml PO Q6H PRN


   PRN Reason: constipation


Levothyroxine Sodium (Synthroid Tab*)  112 mcg PO DAILY@0600 [facility]


   Last Admin: 01/20/17 05:54 Dose:  112 mcg


Losartan Potassium (Cozaar Tab*)  100 mg PO QAM [facility]


   Last Admin: 01/20/17 09:38 Dose:  100 mg


Magnesium Hydroxide (Milk Of Magnesia Liq*)  30 ml PO Q6H PRN


   PRN Reason: constipation


Morphine Sulfate (Morphine Inj (Syringe)*)  2 mg IV Q2H PRN


   PRN Reason: PAIN


   Last Admin: 01/18/17 14:40 Dose:  2 mg


Multivitamins (Theragran Tab*)  1 tab PO DAILY [facility]


   Last Admin: 01/20/17 09:38 Dose:  1 tab


Ondansetron HCl (Zofran Inj*)  4 mg IV Q6H PRN


   PRN Reason: nausea


Ondansetron HCl (Zofran Tab*)  4 mg PO Q6H PRN


   PRN Reason: NAUSEA


Oxycodone HCl (Roxycodone Tab*)  10 mg PO Q4H PRN


   PRN Reason: SEVERE PAIN


   Last Admin: 01/18/17 21:56 Dose:  10 mg


Oxycodone/Acetaminophen (Percocet 5/325 Tab*)  1 tab PO Q3H PRN


   PRN Reason: PAIN - MODERATE


Oxycodone/Acetaminophen (Percocet 5/325 Tab*)  2 tab PO Q3H PRN


   PRN Reason: PAIN - MODERATE


   Last Admin: 01/20/17 09:45 Dose:  2 tab


Pharmacy Profile Note (Coumadin Daily Reminder*)  1 note FOLLOW UP 1700 [facility]


   Last Admin: 01/19/17 15:22 Dose:  1 note


Polyethylene Glycol/Electrolytes (Miralax*)  17 gm PO DAILY PRN


   PRN Reason: Constipation


Simethicone (Mylicon*)  80 mg PO Q6H PRN


   PRN Reason: flatulence


   Last Admin: 01/18/17 13:03 Dose:  80 mg


Warfarin Sodium (Coumadin Tab(*))  8 mg PO ONCE@1700 [facility]


   PRN Reason: Protocol


   Stop: 01/20/17 17:01








 Vital Signs











  01/19/17 01/19/17 01/19/17





  15:47 19:24 19:33


 


Temperature 98.3 F  98.8 F


 


Pulse Rate 102  118


 


Respiratory 20  24





Rate   


 


Blood Pressure 142/55  142/41





(mmHg)   


 


O2 Sat by Pulse 94 94 94





Oximetry   














  01/19/17 01/19/17 01/20/17





  21:45 23:52 03:35


 


Temperature  100.4 F 


 


Pulse Rate  96 


 


Respiratory 20 16 





Rate   


 


Blood Pressure  111/51 





(mmHg)   


 


O2 Sat by Pulse  95 94





Oximetry   














  01/20/17 01/20/17 01/20/17





  04:11 08:00 09:45


 


Temperature 99.0 F 98.9 F 


 


Pulse Rate 89 98 


 


Respiratory 16 16 16





Rate   


 


Blood Pressure 145/49 153/67 





(mmHg)   


 


O2 Sat by Pulse 96 93 





Oximetry   











Oxygen Devices in Use Now: None


Appearance: obese male A+O x3 sitting up in a chair in NAD


Eyes: No Scleral Icterus, PERRLA


Ears/Nose/Mouth/Throat: NL Teeth, Lips, Gums, Mucous Membranes Moist


Neck: NL Appearance and Movements; NL JVP


Respiratory: Symmetrical Chest Expansion and Respiratory Effort, Clear to 

Auscultation


Cardiovascular: NL Sounds; No Murmurs; No JVD, RRR, No Edema


Abdominal: NL Sounds; No Tenderness; No Distention


Extremities: - - knee with cryo unit 


Skin: No Rash or Ulcers, No Nodules or Sclerosis


Neurological: Alert and Oriented x 3, NL Sensation, NL Gait, NL Muscle Strength 

and Tone


Lines/Tubes/Other Access: Clean, Dry and Intact Peripheral IV


Nutrition: Taking PO's


Result Diagrams: 


 01/20/17 06:59





 01/20/17 06:59





Assess/Plan/Problems-Billing


Assessment: 70 yo male with a PMH of morbid obesity, IDDM-2, HTN, COPD, 

hypothyroidism who underwent an elective Right TKR on 1/17/17. 











- Patient Problems


(1) Status post total right knee replacement


Comment: 


POD #3 


Dispo per Ortho


Doing well


Pain management


HH stable


PT/OT   





(2) Diabetes


Comment: 


- Improving; continue home Lantus 80 units BID and continue lispro SS. Continue 

FSBG to ACHS.   





(3) HTN (hypertension)


Comment: 


- stable. continue home meds.    





(4) Hypothyroid


Comment: 


- continue synthroid   





(5) DVT prophylaxis


Comment: lovenox bridge to coumadin   





(6) Full code status





Status and Disposition: 





inpatient. DIspo per Ortho. Hospital Medicine co-medical managing co-morbidities

## 2017-01-20 NOTE — PN
Progress Note





- Progress Note


SOAP: 


Subjective:


[70 y/o male s/p R TKA 1/17/2017.  Patient reports feeling well, no shortness 

of breath or chest pain.   Currently on oxygen  Working with PT, does not want 

to go to nursing home, not cleared by PT, currently looking into PMRU 








Objective:


[General- Well appearing, NAD, alert resting comrfotably 


MSK-   sensation to light touch intact, PT pulses 2+ b/l, mild edema b/l LE's, 

weak dorsiflexion, full plantarflexion R LE.   Incision C/D/I, sutures in place

, dressed with gauze and ACE wrap. 





]


 Vital Signs











Temp  98.9 F   01/20/17 08:00


 


Pulse  98   01/20/17 08:00


 


Resp  16   01/20/17 09:45


 


BP  153/67   01/20/17 08:00


 


Pulse Ox  93   01/20/17 08:00








 Intake & Output











 01/19/17 01/20/17 01/20/17





 18:59 06:59 18:59


 


Intake Total 540 150 


 


Output Total 1075 450 


 


Balance -535 -300 


 


Intake:   


 


  Oral 540 150 


 


Output:   


 


  Urine 1075 450 


 


Other:   


 


  Estimated Void Medium  








 Laboratory Results - last 24 hr











  01/19/17 01/19/17 01/19/17





  11:32 17:26 21:31


 


WBC   


 


RBC   


 


Hgb   


 


Hct   


 


MCV   


 


MCH   


 


MCHC   


 


RDW   


 


Plt Count   


 


MPV   


 


Neut % (Auto)   


 


Lymph % (Auto)   


 


Mono % (Auto)   


 


Eos % (Auto)   


 


Baso % (Auto)   


 


Absolute Neuts (auto)   


 


Absolute Lymphs (auto)   


 


Absolute Monos (auto)   


 


Absolute Eos (auto)   


 


Absolute Basos (auto)   


 


Absolute Nucleated RBC   


 


Nucleated RBC %   


 


INR (Anticoag Therapy)   


 


Sodium   


 


Potassium   


 


Chloride   


 


Carbon Dioxide   


 


Anion Gap   


 


BUN   


 


Creatinine   


 


Est GFR ( Amer)   


 


Est GFR (Non-Af Amer)   


 


BUN/Creatinine Ratio   


 


Glucose   


 


POC Glucose (mg/dL)  268 H  174 H  246 H


 


Calcium   














  01/20/17 01/20/17 01/20/17





  06:59 06:59 06:59


 


WBC  12.2 H  


 


RBC  4.06  


 


Hgb  11.9 L  


 


Hct  36 L  


 


MCV  89  


 


MCH  29  


 


MCHC  33  


 


RDW  14  


 


Plt Count  125 L  


 


MPV  9  


 


Neut % (Auto)  78.3  


 


Lymph % (Auto)  8.8 L  


 


Mono % (Auto)  10.0 H  


 


Eos % (Auto)  2.3  


 


Baso % (Auto)  0.6  


 


Absolute Neuts (auto)  9.5 H  


 


Absolute Lymphs (auto)  1.1  


 


Absolute Monos (auto)  1.2 H  


 


Absolute Eos (auto)  0.3  


 


Absolute Basos (auto)  0.1  


 


Absolute Nucleated RBC  0.01  


 


Nucleated RBC %  0.1  


 


INR (Anticoag Therapy)   1.43 H 


 


Sodium    132 L


 


Potassium    4.1


 


Chloride    98 L


 


Carbon Dioxide    29


 


Anion Gap    5


 


BUN    17


 


Creatinine    0.90


 


Est GFR ( Amer)    107.3


 


Est GFR (Non-Af Amer)    83.4


 


BUN/Creatinine Ratio    18.9


 


Glucose    150 H


 


POC Glucose (mg/dL)   


 


Calcium    9.2














  01/20/17





  08:42


 


WBC 


 


RBC 


 


Hgb 


 


Hct 


 


MCV 


 


MCH 


 


MCHC 


 


RDW 


 


Plt Count 


 


MPV 


 


Neut % (Auto) 


 


Lymph % (Auto) 


 


Mono % (Auto) 


 


Eos % (Auto) 


 


Baso % (Auto) 


 


Absolute Neuts (auto) 


 


Absolute Lymphs (auto) 


 


Absolute Monos (auto) 


 


Absolute Eos (auto) 


 


Absolute Basos (auto) 


 


Absolute Nucleated RBC 


 


Nucleated RBC % 


 


INR (Anticoag Therapy) 


 


Sodium 


 


Potassium 


 


Chloride 


 


Carbon Dioxide 


 


Anion Gap 


 


BUN 


 


Creatinine 


 


Est GFR ( Amer) 


 


Est GFR (Non-Af Amer) 


 


BUN/Creatinine Ratio 


 


Glucose 


 


POC Glucose (mg/dL)  210 H


 


Calcium 








 Active Medications











Generic Name Dose Route Start Last Admin





  Trade Name Freq  PRN Reason Stop Dose Admin


 


Acetaminophen  650 mg  01/17/17 16:37  01/19/17 23:58





  Tylenol Tab*  PO   650 mg





  Q4H PRN   Administration





  PAIN OR TEMPERATURE   


 


Albuterol  2 puff  01/17/17 16:44  





  Ventolin Hfa Inhaler*  INH   





  Q4H PRN   





  SHORTNESS OF BREATH   


 


Bisacodyl  10 mg  01/17/17 16:37  





  Dulcolax Supp*  MT   





  DAILY PRN   





  constipation   


 


Clonidine HCl  0.3 mg  01/17/17 22:00  01/20/17 09:38





  Catapres Tab*  PO   0.3 mg





  BID SEDA   Administration


 


Dextrose  12.5 gm  01/17/17 18:58  01/17/17 19:19





  D50w Syringe 50 Ml*  IV PUSH   12.5 gm





  .FOR FS < 60 - SS PRN   Administration





  FS < 60   


 


Diphenhydramine HCl  12.5 mg  01/18/17 07:00  





  Benadryl Iv*  IV   





  Q6H PRN   





  PRURITIS   


 


Diphenhydramine HCl  25 mg  01/18/17 07:00  





  Benadryl Po*  PO   





  Q6H PRN   





  itching   


 


Docusate Sodium  100 mg  01/17/17 21:00  01/20/17 09:39





  Colace Cap*  PO   100 mg





  BID SEDA   Administration


 


Enoxaparin Sodium  30 mg  01/18/17 15:00  01/19/17 15:20





  Lovenox(*)  SUBCUT   30 mg





  Q24H SEDA   Administration


 


Finasteride  5 mg  01/17/17 22:00  01/19/17 17:49





  Proscar Tab*  PO   5 mg





  QPM SEDA   Administration





  Protocol   


 


Lactated Ringer's  1,000 mls @ 100 mls/hr  01/17/17 17:00  01/18/17 15:43





  Lactated Ringers 1000 Ml Bag*  IV   100 mls/hr





  PER RATE SEDA   Administration


 


Insulin Glargine  80 units  01/18/17 21:00  01/20/17 09:39





  Lantus(*)  SUBCUT   80 units





  BID SEDA   Administration


 


Insulin Human Lispro  0 units  01/19/17 16:30  01/20/17 09:40





  Humalog*  SUBCUT   6 units





  ACHS SEDA   Administration





  Protocol   


 


Lactulose  30 ml  01/17/17 16:37  





  Lactulose*  PO   





  Q6H PRN   





  constipation   


 


Levothyroxine Sodium  112 mcg  01/18/17 06:00  01/20/17 05:54





  Synthroid Tab*  PO   112 mcg





  DAILY@0600 SEDA   Administration


 


Losartan Potassium  100 mg  01/18/17 09:00  01/20/17 09:38





  Cozaar Tab*  PO   100 mg





  QAM SEDA   Administration


 


Magnesium Hydroxide  30 ml  01/17/17 16:37  





  Milk Of Magnesia Liq*  PO   





  Q6H PRN   





  constipation   


 


Morphine Sulfate  2 mg  01/18/17 07:00  01/18/17 14:40





  Morphine Inj (Syringe)*  IV   2 mg





  Q2H PRN   Administration





  PAIN   


 


Multivitamins  1 tab  01/18/17 09:00  01/20/17 09:38





  Theragran Tab*  PO   1 tab





  DAILY SEDA   Administration


 


Ondansetron HCl  4 mg  01/18/17 07:00  





  Zofran Inj*  IV   





  Q6H PRN   





  nausea   


 


Ondansetron HCl  4 mg  01/18/17 07:00  





  Zofran Tab*  PO   





  Q6H PRN   





  NAUSEA   


 


Oxycodone HCl  10 mg  01/18/17 07:00  01/18/17 21:56





  Roxycodone Tab*  PO   10 mg





  Q4H PRN   Administration





  SEVERE PAIN   


 


Oxycodone/Acetaminophen  1 tab  01/18/17 07:00  





  Percocet 5/325 Tab*  PO   





  Q3H PRN   





  PAIN - MODERATE   


 


Oxycodone/Acetaminophen  2 tab  01/18/17 07:00  01/20/17 09:45





  Percocet 5/325 Tab*  PO   2 tab





  Q3H PRN   Administration





  PAIN - MODERATE   


 


Pharmacy Profile Note  1 note  01/19/17 17:00  01/19/17 15:22





  Coumadin Daily Reminder*  FOLLOW UP   1 note





  1700 SEDA   Administration


 


Polyethylene Glycol/Electrolytes  17 gm  01/17/17 16:37  





  Miralax*  PO   





  DAILY PRN   





  Constipation   


 


Simethicone  80 mg  01/18/17 12:09  01/18/17 13:03





  Mylicon*  PO   80 mg





  Q6H PRN   Administration





  flatulence   














Assessment:


70 y/o male s/p R TKA 1/17/2017





Plan:


- Awaiting PMRU recommendations 


- INR subtheraputic, continue lovenox, couamdin 8mg tonight. 


- Continue PT/ OT 


- Continue to monitor WBC- stable.  


- Appreciate hospitalist consult, continue to monitor DM.

## 2017-01-21 VITALS — DIASTOLIC BLOOD PRESSURE: 58 MMHG | SYSTOLIC BLOOD PRESSURE: 138 MMHG

## 2017-01-21 LAB
HCT VFR BLD AUTO: 37 % (ref 42–52)
HGB BLD-MCNC: 12 G/DL (ref 14–18)

## 2017-01-21 RX ADMIN — LOSARTAN POTASSIUM SCH MG: 25 TABLET, FILM COATED ORAL at 08:25

## 2017-01-21 RX ADMIN — ENOXAPARIN SODIUM SCH MG: 30 INJECTION SUBCUTANEOUS at 11:37

## 2017-01-21 RX ADMIN — THERA TABS SCH TAB: TAB at 08:25

## 2017-01-21 RX ADMIN — OXYCODONE HYDROCHLORIDE AND ACETAMINOPHEN PRN TAB: 5; 325 TABLET ORAL at 04:20

## 2017-01-21 RX ADMIN — INSULIN LISPRO SCH: 100 INJECTION, SOLUTION INTRAVENOUS; SUBCUTANEOUS at 07:34

## 2017-01-21 RX ADMIN — LEVOTHYROXINE SODIUM SCH MCG: 112 TABLET ORAL at 05:48

## 2017-01-21 RX ADMIN — OXYCODONE HYDROCHLORIDE AND ACETAMINOPHEN PRN TAB: 5; 325 TABLET ORAL at 07:37

## 2017-01-21 RX ADMIN — DOCUSATE SODIUM SCH MG: 100 CAPSULE, LIQUID FILLED ORAL at 08:25

## 2017-01-21 RX ADMIN — CLONIDINE HYDROCHLORIDE SCH MG: 0.1 TABLET ORAL at 08:25

## 2017-01-21 RX ADMIN — OXYCODONE HYDROCHLORIDE AND ACETAMINOPHEN PRN TAB: 5; 325 TABLET ORAL at 11:36

## 2017-01-21 NOTE — PN
Progress Note





- Progress Note


SOAP: 


Subjective:


Pt. reports he is doing better today with PT and wants to go home.  








Objective:


RLE - inc c/d/i.  distally + df/pf, full sens lt, 2+ dp pulse.  





Vital Signs:











Temp Pulse Resp BP Pulse Ox


 


 98.7 F   98   18   130/64   94 


 


 01/21/17 07:23  01/21/17 07:23  01/21/17 07:37  01/21/17 07:23  01/21/17 07:23








 Laboratory Results - last 24 hr











  01/20/17 01/20/17 01/20/17





  11:49 17:25 21:21


 


Hgb   


 


Hct   


 


INR (Anticoag Therapy)   


 


POC Glucose (mg/dL)  202 H  170 H  157 H














  01/21/17 01/21/17 01/21/17





  06:54 06:54 07:31


 


Hgb  12.0 L  


 


Hct  37 L  


 


INR (Anticoag Therapy)   1.36 H 


 


POC Glucose (mg/dL)    89

















Assessment:


72 yo M pod 4 s/p RTKA








Plan:


give lovenox dose today.  8 mg coumadin sat and sun.  recheck inr on monday


cont pt/ot 


wbat rle 


d/c to home today with vns

## 2017-01-22 NOTE — DS
*** DICTATION ENDS ABRUPTLY - FULLY REDICTATED ***



DISCHARGE SUMMARY:

 

DATE OF ADMISSION:  01/17/17

 

DATE OF DISCHARGE:  01/21/17

 

PROVIDER:  Wendy Payan MD

 

ADMITTING DIAGNOSIS:  Severe right knee osteoarthritis.

 

PROCEDURE:  Right total knee arthroplasty.

 

SECONDARY DIAGNOSES:

1.  Morbid obesity.

2.  Diabetes.

3.  Hypertension.

4.  Osteoarthritis.

5.  Chronic obstructive pulmonary disease.

6.  Spinal stenosis.

7.  Cervical spondylosis.

8.  Peripheral neuropathy.

9.  Hernia.

 

CONSULTATIONS:  Physical Therapy, Occupational Therapy, and Medicine.

 

HISTORY OF PRESENT ILLNESS:  Mr. Terwilliger is a 71-year-old gentleman who 
presented with ongoing right knee pain due to severe osteoarthritis.  He has 
failed conservative measures and therefore has elected to undergo a right total 
knee arthroplasty with Dr. Payan.

 

HOSPITAL COURSE:  Mr. Terwilliger was admitted to [facility] on 01/17
/17.  He underwent a right total knee arthroplasty.  Postoperatively, he 
recovered on the short stay surgical unit.  On postop day 1, his Antoine catheter 
was removed and he was able to urinate on his own.  He was advanced to a 
regular diet without difficulty and his pain was controlled with p.o. Percocet 
and was restarted on home medications.  His labs and vitals remained stable.  
He was able to weight bear as tolerated on the right lower extremity.  He 
advanced slowly with physical therapy and occupational therapy.  His DVT 
prophylaxis was managed with Lovenox and Coumadin until he reached a 
therapeutic INR.  On postoperative day 4, he was orthopedically and medically 
stable for discharge to home with visiting nursing services.

 

PHYSICAL EXAMINATION:  General:  Well developed, well nourished male, in no 
acute distress.  Right lower extremity:  Well healing surgical incision with no 
signs of infection.  Dry sterile dressing was applied.  Active ankle 
dorsiflexion and plantar flexion.  Cast is soft and nontender.  +2 dorsalis 
pedis pulse.  Sensation is intact to light touch distally.

 

LAB DATA:  On the day of discharge, hemoglobin 12, hematocrit 37, INR 1.36.

 

RADIOGRAPHS:  Postop radiograph of the right knee demonstrate a right total 
knee arthroplasty with satisfactory placement.

 

DISCHARGE MEDICATION:  New medications on discharge to include:

 

1.  Coumadin 2 mg tablets by mouth daily as directed by provider.

2.  Docusate capsule 100 mg up to 3 times a day as needed for constipation.

3.  Percocet 5/325 one to two tabs every 4 hours as needed for pain.

 

Home medications continued on discharge to include:

 

1.  Losartan _____ mg by mouth every morning.

2.  Synthroid 112 mcg by mouth daily.

3.  Insulin glargine 80 units subcu twice a day.

4.  Avodart 0.5 mg one by mouth every night.

5.  Aspirin 81 mg by mouth every morning.  It will be held until Coumadin is 
over.

6.  Clonidine 0.3 mg by mouth twice a day.

7.  Albuterol inhaler 2 puffs every 4 hours as needed.

8.  Saline nasal spray 1 spray both nares every 4 hours as needed.

9.  Simethicone 80 mg by mouth 4 times a day as needed.

10.  NovoLog insulin 12 units subcu.  See instructions.

11.  Charcoal activated capsules 260 mg 1 to 2 capsules by mouth twice a day.

 

DICTATION ENDS ABRUPTLY HERE

 

____________________________________ DAVID MORIN

 

[id number]/[facility] #: [id number]

MADIE

## 2017-01-22 NOTE — DS
*** DICTATION ENDS ABRUPTLY - FULLY REDICTATED ***



DISCHARGE SUMMARY:

 

DATE OF ADMISSION:  01/17/17

 

DATE OF DISCHARGE:  01/21/17

 

PROVIDER:  Wendy Payan MD

 

ADMITTING DIAGNOSIS:  Severe right knee osteoarthritis.

 

PROCEDURE:  Right total knee arthroplasty.

 

SECONDARY DIAGNOSES:

1.  Morbid obesity.

2.  Diabetes.

3.  Hypertension.

4.  Osteoarthritis.

5.  Chronic obstructive pulmonary disease.

6.  Spinal stenosis.

7.  Cervical spondylosis.

8.  Peripheral neuropathy.

9.  Hernia.

 

CONSULTATION:  Physical Therapy, Occupational Therapy, and Medicine.

 

HISTORY OF PRESENT ILLNESS:  Mr. Terwilliger is a 71-year-old male who presents 
to the clinic with ongoing right knee pain for several years due to severe end-
stage osteoarthritis.  He has failed conservative measures and therefore 
elected to undergo a right total knee arthroplasty with Dr. Payan on 01/17/17.

 

HOSPITAL COURSE:  Mr. Terwilliger was admitted to [facility] on 01/17
/17.  He underwent a right total knee arthroplasty.  Postoperatively, he 
recovered on the short-stay surgical unit.  On postop day#1, his Antoine catheter 
was removed and he is able to urinate on his own.  He advanced to a regular 
diet without difficulty and his pain was controlled with p.o. Percocet and he 
was restarted on his home medications.  His labs and vitals remained stable.  
He was able to bear weight as tolerated on the right lower extremity.  He 
advanced slowly with physical therapy and occupational therapy.  His DVT 
prophylaxis was managed with Lovenox and Coumadin until he reached a 
therapeutic INR.  By postop day#4, he was orthopedically and medically stable 
for discharge to home with visiting nursing services.

 

PHYSICAL EXAMINATION:  General:  The patient is a 71-year-old male, in no acute 
distress, lying comfortably in hospital bed, alert and oriented x3.  Right 
lower extremity, well healed and surgical incision over the right knee with no 
erythema, purulent drainage or signs of infection.  A clean dressing was 
applied.  Active ankle dorsiflexion and plantarflexion.  +2 dorsalis pedis 
pulse.  Sensation intact to light touch distally.

 

LABORATORY DATA:  On day of discharge, hemoglobin 12, hematocrit 37, INR 1.36.

 

Postop radiographs of the right knee demonstrate right total knee arthroplasty 
with satisfactory prosthesis placement and no acute bony abnormalities.

 

DISCHARGE CONDITION:  Stable.

 

DISCHARGE MEDICATIONS:  Medications on discharge to include:  Docusate capsule 
100 mg p.o. up to 3 times a day as needed for constipation.

 

DICTATION ENDS ABRUPTLY HERE

 

____________________________________ DAVID MORIN

 

[id number]/[facility] #: [id number]

[email or web address]

## 2017-01-22 NOTE — DS
DISCHARGE SUMMARY:

 

DATE OF ADMISSION:  01/17/17

 

DATE OF DISCHARGE:  01/21/17

 

PROVIDER:  Dr. Wendy Payan.

 

ADMISSION DIAGNOSIS:  Severe right knee osteoarthritis.

 

PROCEDURE:  Right total knee arthroplasty.

 

SECONDARY DIAGNOSES:

1.  Morbid obesity.

2.  Diabetes.

3.  Hypertension.

4.  Osteoarthritis.

5.  Chronic obstructive pulmonary disease.

6.  Spinal stenosis.

7.  Cervical spondylosis.

8.  Peripheral neuropathy.

9.  Hernia.

 

CONSULTATIONS:  Physical Therapy, Occupational Therapy, and Medicine.

 

HISTORY OF PRESENT ILLNESS:  Mr. Terwilliger is a 71-year-old male who presents 
to the clinic for ongoing right knee pain due to severe end-stage right knee 
osteoarthritis.  The patient has failed conservative measures and therefore 
agreed to undergo a right total knee arthroplasty with Dr. Payan.

 

HOSPITAL COURSE:  Mr. Terwilliger was admitted to [facility] on 01/17
/17.  He underwent a right total knee arthroplasty.  Postoperatively, he 
recovered on the short-stay surgical unit.  Postop day 1, his Antoine was removed 
and he was able to urinate on his own.  He was advanced to a regular diet 
without difficulty and his pain was controlled with Percocet and he was 
restarted on his home medications.  His labs and vitals remained stable.  He 
was able to weight bear as tolerated on the right lower extremity.  He advanced 
slowly with physical therapy and occupational therapy.  His DVT prophylaxis was 
managed with Lovenox and Coumadin until he reached a therapeutic INR.  Postop 
day 4, he was orthopedically and medically stable for discharge to home with 
visiting nursing services.

 

PREOP PHYSICAL EXAMINATION:  General:  A 71-year-old male, in no acute 
distress. Alert and oriented x3.  Right lower extremity:  Surgical incision is 
healing well with no signs of infection.  A dry sterile dressing was applied.  
He has ankle dorsiflexion and plantar flexion.  +2 DP pulse.  Sensation is 
intact to light touch distally.

 

LABORATORY DATA:  Labs on the day of discharge, hemoglobin 12, hematocrit 37.  
INR 1.37.

 

DISCHARGE MEDICATIONS:  New medications on discharge to include:

1.  Colace 100 mg capsules, take up to 3 times a day as needed for constipation.

2.  Warfarin 2 mg by mouth daily, take as directed.

3.  Percocet 5/325 one or two tabs every 4 to 6 hours as needed for pain.

 

Home medications continued on discharge to include:

1.  Losartan 100 mg by mouth every morning.

2.  Levothyroxine 112 mcg by mouth daily.

3.  Insulin glargine 80 units subcu twice a day.

4.  Avodart 0.5 mg 1 by mouth every night.

5.  Aspirin 81 mg p.o. every morning.  This will be stopped until Coumadin ends.

6.  Clonidine 0.3 mg p.o. twice a day.

7.  Albuterol inhaler 2 puffs every 4 hours as needed.

8.  Saline nasal spray, 1 spray both nares every 4 hours.

9.  Gas-X 80 mg p.o. 4 times a day as needed.

10.  NovoLog 12 units subcu; see instructions.

11.  Activated charcoal 260 mg capsule 1 or 2 tabs by mouth twice a day.

12.  Acetaminophen 50 mg 1 to 2 tabs p.o. twice a day as needed.

 

CONDITION ON DISCHARGE:  Stable.

 

DISCHARGE INSTRUCTIONS:  Mr. Terwilliger is a 71-year-old gentleman postop day 
4 status post right total knee arthroplasty, which was uncomplicated.  He is 
orthopedically and medically stable for discharge to home with VNS services.  
His labs and vitals were stable.  He was restarted on his home medications.  He 
will take 8 mg of Coumadin tonight and tomorrow and will re-draw on Monday.  He 
will have INR draws on Mondays and Thursdays and PT at home twice a week.  He 
is weight bearing as tolerated on the right lower extremity.  He will take 
Percocet for pain control and Colace up to 3 times a day for constipation.  He 
will follow up with Dr. Payan in 10 to 14 days for incision check and suture 
removal.  He may shower postop day 4, but was instructed not to submerge the 
wound in water.  He is to keep his dressings clean, dry, and intact.  The VNS 
nurses may change the dressing.  He is instructed to go to the ER immediately 
if he develops chest pain, shortness of breath, fever greater than 101.5, or 
calf pain or tenderness.  The patient is to call the office with any questions 
or concerns.

 

____________________________________ DAVID MORIN

 

[id number]/[facility] #: [id number]

MADIE

## 2017-07-14 ENCOUNTER — HOSPITAL ENCOUNTER (EMERGENCY)
Dept: HOSPITAL 25 - ED | Age: 72
Discharge: HOME | End: 2017-07-14
Payer: MEDICARE

## 2017-07-14 VITALS — DIASTOLIC BLOOD PRESSURE: 59 MMHG | SYSTOLIC BLOOD PRESSURE: 149 MMHG

## 2017-07-14 DIAGNOSIS — R07.9: ICD-10-CM

## 2017-07-14 DIAGNOSIS — R60.1: ICD-10-CM

## 2017-07-14 DIAGNOSIS — Z87.891: ICD-10-CM

## 2017-07-14 DIAGNOSIS — R06.02: ICD-10-CM

## 2017-07-14 DIAGNOSIS — R91.1: Primary | ICD-10-CM

## 2017-07-14 DIAGNOSIS — R53.1: ICD-10-CM

## 2017-07-14 LAB
ALBUMIN SERPL BCG-MCNC: 3.9 G/DL (ref 3.2–5.2)
ALP SERPL-CCNC: 72 U/L (ref 34–104)
ALT SERPL W P-5'-P-CCNC: 26 U/L (ref 7–52)
ANION GAP SERPL CALC-SCNC: 6 MMOL/L (ref 2–11)
AST SERPL-CCNC: 18 U/L (ref 13–39)
BUN SERPL-MCNC: 14 MG/DL (ref 6–24)
BUN/CREAT SERPL: 17.5 (ref 8–20)
CALCIUM SERPL-MCNC: 9.7 MG/DL (ref 8.6–10.3)
CHLORIDE SERPL-SCNC: 100 MMOL/L (ref 101–111)
CK SERPL-CCNC: 152 U/L (ref 10–223)
GLOBULIN SER CALC-MCNC: 3.8 G/DL (ref 2–4)
GLUCOSE SERPL-MCNC: 149 MG/DL (ref 70–100)
HCO3 SERPL-SCNC: 29 MMOL/L (ref 22–32)
HCT VFR BLD AUTO: 46 % (ref 42–52)
HGB BLD-MCNC: 14.9 G/DL (ref 14–18)
MCH RBC QN AUTO: 29 PG (ref 27–31)
MCHC RBC AUTO-ENTMCNC: 32 G/DL (ref 31–36)
MCV RBC AUTO: 90 FL (ref 80–94)
POTASSIUM SERPL-SCNC: 3.8 MMOL/L (ref 3.5–5)
PROT SERPL-MCNC: 7.7 G/DL (ref 6.4–8.9)
RBC # BLD AUTO: 5.14 10^6/UL (ref 4–5.4)
SODIUM SERPL-SCNC: 135 MMOL/L (ref 133–145)
TROPONIN I SERPL-MCNC: 0.01 NG/ML (ref ?–0.04)
WBC # BLD AUTO: 8.6 10^3/UL (ref 3.5–10.8)

## 2017-07-14 PROCEDURE — 85610 PROTHROMBIN TIME: CPT

## 2017-07-14 PROCEDURE — 99284 EMERGENCY DEPT VISIT MOD MDM: CPT

## 2017-07-14 PROCEDURE — 85025 COMPLETE CBC W/AUTO DIFF WBC: CPT

## 2017-07-14 PROCEDURE — 85379 FIBRIN DEGRADATION QUANT: CPT

## 2017-07-14 PROCEDURE — 86140 C-REACTIVE PROTEIN: CPT

## 2017-07-14 PROCEDURE — 71020: CPT

## 2017-07-14 PROCEDURE — 85730 THROMBOPLASTIN TIME PARTIAL: CPT

## 2017-07-14 PROCEDURE — 82550 ASSAY OF CK (CPK): CPT

## 2017-07-14 PROCEDURE — 93005 ELECTROCARDIOGRAM TRACING: CPT

## 2017-07-14 PROCEDURE — 82553 CREATINE MB FRACTION: CPT

## 2017-07-14 PROCEDURE — 87040 BLOOD CULTURE FOR BACTERIA: CPT

## 2017-07-14 PROCEDURE — 83880 ASSAY OF NATRIURETIC PEPTIDE: CPT

## 2017-07-14 PROCEDURE — 71275 CT ANGIOGRAPHY CHEST: CPT

## 2017-07-14 PROCEDURE — 36415 COLL VENOUS BLD VENIPUNCTURE: CPT

## 2017-07-14 PROCEDURE — 83605 ASSAY OF LACTIC ACID: CPT

## 2017-07-14 PROCEDURE — 84484 ASSAY OF TROPONIN QUANT: CPT

## 2017-07-14 PROCEDURE — 80053 COMPREHEN METABOLIC PANEL: CPT

## 2017-07-14 NOTE — RAD
Indication: Shortness of breath, increased troponin.



Contrast: Administered 95.9 ml of VISAPAQUE 320 mgi/ml



CTA of the chest was performed after IV contrast administration. Coronal and sagittal



The pulmonary arterial tree is well opacified. There are no filling defects present to

suggest. Right basilar atelectasis is noted.



Small prevascular space lymph nodes are noted measuring up to 7 mm. AP window lymph nodes

noted in the 9 mm coronary artery calcifications are noted. The heart demonstrates no

pericardial lesion.



The trachea and major bronchi appear patent. No alveolar consolidation is noted. No

pleural fluid is identified. There is a nodule in the left upper lobe which appears to

adjacent nodules measuring 8 and 6 mm. When compared to previous exam of these were

present previously. They appear similar in appearance. Scarring is noted right lower lobe

posteriorly. No pleural fluid is identified. There is hepatic steatosis noted.



IMPRESSION: No definite evidence of pulmonary embolus. There are 2 adjacent nodules in

left upper lobe which were likely present on previous exam of July 09, 2016. They measure

8 mm and 5 mm. These are likely not significantly changed however further follow-up is

suggested. Scarring and atelectasis in right lung base is noted.



Hepatic steatosis is present.

## 2017-07-14 NOTE — ED
Bacilio FLETCHER Auryana, scribed for Robert Davenport MD on 07/14/17 at 1045 .





Respiratory





- HPI Summary


HPI Summary: 


71 year old male BIB EMS with SOB. He reports CP earlier today (none presently) 

and weakness. Patient states he has chronic LE edema s/p knee repair. He denies 

any diaphoresis, nausea, vomiting, or cough. PMHx is significant for morbid 

obesity, COPD, asthma, sleep apnea, HTN, and HLD.  











- History of Current Complaint


Chief Complaint: EDShortnessOfBreath


Stated Complaint: SHORT OF BREATH


Time Seen by Provider: 07/14/17 10:42


Hx Obtained From: Patient


Onset/Duration: Lasting Days, Worse Since - today


Current Severity: None


Pain Intensity: 0


Associated Signs and Symptoms: SOB, Chest Pain - earlier today, not currently 

present, Edema





- Allergy/Home Medications


Allergies/Adverse Reactions: 


 Allergies











Allergy/AdvReac Type Severity Reaction Status Date / Time


 


Amlodipine Allergy  Shortness Verified 01/17/17 12:22





   of Breath  


 


Amoxicillin Allergy  Unknown Verified 01/17/17 12:22





   Reaction  





   Details  


 


Atenolol Allergy  Shortness Verified 01/17/17 12:22





   of Breath  


 


Baclofen Allergy  Unknown Verified 01/17/17 12:22





   Reaction  





   Details  


 


Enalapril Allergy  Shortness Verified 01/17/17 12:22





   of Breath  


 


Esomeprazole Allergy  Unknown Verified 01/17/17 12:22





   Reaction  





   Details  


 


Glipizide Allergy  Unknown Verified 01/17/17 12:22





   Reaction  





   Details  


 


Montelukast [From Singulair] Allergy  Unknown Verified 01/17/17 12:22





   Reaction  





   Details  


 


Oxybutynin Allergy  Unknown Verified 01/17/17 12:22





   Reaction  





   Details  


 


Paroxetine [From Paxil] Allergy  Unknown Verified 01/17/17 12:22





   Reaction  





   Details  


 


Zolpidem [From Ambien] Allergy  Unknown Verified 01/17/17 12:22





   Reaction  





   Details  


 


Atorvastatin [From Lipitor] AdvReac Severe musculoskel Verified 01/17/17 12:22





   etal  


 


Pantoprazole AdvReac Severe musculoskel Verified 01/17/17 12:22





   etal  


 


Simvastatin AdvReac Severe musculoskel Verified 01/17/17 12:22





   etal  


 


Hydrochlorothiazide AdvReac Intermediate increases Verified 01/17/17 12:22





   blood  





   sugars  


 


Sulfa Antibiotics AdvReac Unknown unkno Verified 01/17/17 12:22














PMH/Surg Hx/FS Hx/Imm Hx


Endocrine/Hematology History: Reports: Hx Diabetes, Hx Thyroid Disease


Cardiovascular History: Reports: Hx Hypercholesterolemia, Hx Hypertension


   Denies: Hx Angina, Hx Congestive Heart Failure, Hx Coronary Artery Disease, 

Hx Myocardial Infarction, Hx Pacemaker/ICD, Hx Valvular Heart Disease


Respiratory History: Reports: Hx Asthma - AS A CHILD(prior record), Hx Chronic 

Obstructive Pulmonary Disease (COPD), Hx Pneumonia, Hx Sleep Apnea


GI History: Reports: Hx Irritable Bowel, Other GI Disorders - OCC STOMACH PAIN, 

NEG WORKUP PER DR GAYTAN; IBS per [place]


   Denies: Hx Ulcer


 History: Reports: Other  Problems/Disorders - BPH


   Denies: Hx Renal Disease


Musculoskeletal History: Reports: Hx Arthritis, Other Musculoskeletal History - 

CERVICAL SPONDYLOSIS


Sensory History: Reports: Hx Contacts or Glasses, Hx Deafness - [place] not deaf


   Denies: Hx Hearing Aid


Opthamlomology History: Reports: Hx Contacts or Glasses


Neurological History: Reports: Other Neuro Impairments/Disorders - BALANCE 

ISSUES, USES WALKER(states trouble seeing at night)


Psychiatric History: 


   Denies: Hx Panic Disorder





- Surgical History


Surgery Procedure, Year, and Place: lt knee replacement 2013, lt great toe 

REATTACHED 1970, GALLBLADDER.  1/29/16 ANTERIOR CERVICAL DISC AND FUSION C4-5-6


Hx Anesthesia Reactions: No


Infectious Disease History: 


   Denies: Hx Clostridium Difficile, Hx Hepatitis, Hx Human Immunodeficiency 

Virus (HIV), Hx of Known/Suspected MRSA, Hx Shingles, Hx Tuberculosis, Hx Known/

Suspected VRE, Hx Known/Suspected VRSA, History Other Infectious Disease, 

Traveled Outside the [place] in Last 30 Days





- Family History


Known Family History: Positive: Cardiac Disease





- Social History


Occupation: Retired


Lives: With Family - wife


Alcohol Use: None


Hx Substance Use: No


Substance Use Type: Reports: None


Hx Tobacco Use: Yes


Smoking Status (MU): Former Smoker


Length of Time of Smoking/Using Tobacco: 15 YRS


Have You Smoked in the Last Year: No





Review of Systems


Constitutional: Negative


Negative: Skin Diaphoresis


Eyes: Negative


ENT: Negative


Positive: Chest Pain - earlier today, not currently present


Positive: Shortness Of Breath.  Negative: Cough


Gastrointestinal: Negative


Genitourinary: Negative


Positive: Other - weakness


Skin: Negative


Neurological: Negative


Psychological: Normal


All Other Systems Reviewed And Are Negative: Yes





Physical Exam





- Summary


Physical Exam Summary: 





VITAL SIGNS: Reviewed. 


GENERAL:  Patient is a well-developed and obese male who is lying comfortable 

in the stretcher.  Patient is not in any acute respiratory distress. 


HEAD AND FACE: No signs of trauma.  No ecchymosis, hematomas or skull 

depressions. No sinus tenderness. 


EYES: PERRLA, EOMI x 2, No injected conjunctiva, no nystagmus.


EARS: Hearing grossly intact. Ear canals and tympanic membranes are within 

normal limits. 


MOUTH: Oropharynx within normal limits. 


NECK: Supple, trachea is midline, no adenopathy, no JVD, no carotid bruit, no c-

spine tenderness, neck with full ROM.


CHEST: Symmetric, no tenderness at palpation 


LUNGS: No respiratory distress. Patient speaks in full sentences. Clear to 

auscultation bilaterally. No wheezing or crackles.


CVS: Regular rate and rhythm, S1 and S2 present, no murmurs or gallops 

appreciated. 


ABDOMEN: Soft, non-tender. No signs of distention. No rebound no guarding, and 

no masses palpated. Bowel sounds are normal. 


EXTREMITIES: FROM in all major joints, no cyanosis or clubbing. Bilateral lower 

extremities edema 4+.


NEURO: Alert and oriented x 3. No acute neurological deficits. Speech is normal 

and follows commands.


SKIN: Dry and warm.





Triage Information Reviewed: Yes


Vital Signs On Initial Exam: 


 Initial Vitals











Temp Pulse Resp BP Pulse Ox


 


 99.1 F   116   22   170/69   92 


 


 07/14/17 10:14  07/14/17 10:14  07/14/17 10:14  07/14/17 10:14  07/14/17 10:14











Vital Signs Reviewed: Yes





Diagnostics





- Vital Signs


 Vital Signs











  Temp Pulse Resp BP Pulse Ox


 


 07/14/17 10:14  99.1 F  116  22  170/69  92














- Laboratory


Lab Results: 


 Lab Results











  07/14/17 07/14/17 07/14/17 Range/Units





  11:25 11:25 11:25 


 


WBC  8.6    (3.5-10.8)  10^3/ul


 


RBC  5.14    (4.0-5.4)  10^6/ul


 


Hgb  14.9    (14.0-18.0)  g/dl


 


Hct  46    (42-52)  %


 


MCV  90    (80-94)  fL


 


MCH  29    (27-31)  pg


 


MCHC  32    (31-36)  g/dl


 


RDW  14    (10.5-15)  %


 


Plt Count  145 L    (150-450)  10^3/ul


 


MPV  9    (7.4-10.4)  um3


 


Neut % (Auto)  80.1    (38-83)  %


 


Lymph % (Auto)  11.4 L    (25-47)  %


 


Mono % (Auto)  5.5    (1-9)  %


 


Eos % (Auto)  1.2    (0-6)  %


 


Baso % (Auto)  1.8    (0-2)  %


 


Absolute Neuts (auto)  6.9    (1.5-7.7)  10^3/ul


 


Absolute Lymphs (auto)  1.0    (1.0-4.8)  10^3/ul


 


Absolute Monos (auto)  0.5    (0-0.8)  10^3/ul


 


Absolute Eos (auto)  0.1    (0-0.6)  10^3/ul


 


Absolute Basos (auto)  0.2    (0-0.2)  10^3/ul


 


Absolute Nucleated RBC  0    10^3/ul


 


Nucleated RBC %  0    


 


INR (Anticoag Therapy)     (0.89-1.11)  


 


APTT     (26.0-36.3)  seconds


 


D-Dimer, Quantitative     (Less Than 230)  ng/mL


 


Sodium   135   (133-145)  mmol/L


 


Potassium   3.8   (3.5-5.0)  mmol/L


 


Chloride   100 L   (101-111)  mmol/L


 


Carbon Dioxide   29   (22-32)  mmol/L


 


Anion Gap   6   (2-11)  mmol/L


 


BUN   14   (6-24)  mg/dL


 


Creatinine   0.80   (0.67-1.17)  mg/dL


 


Est GFR ( Amer)   122.9   (>60)  


 


Est GFR (Non-Af Amer)   95.6   (>60)  


 


BUN/Creatinine Ratio   17.5   (8-20)  


 


Glucose   149 H   ()  mg/dL


 


Lactic Acid    1.7  (0.5-2.0)  mmol/L


 


Calcium   9.7   (8.6-10.3)  mg/dL


 


Total Bilirubin   0.70   (0.2-1.0)  mg/dL


 


AST   18   (13-39)  U/L


 


ALT   26   (7-52)  U/L


 


Alkaline Phosphatase   72   ()  U/L


 


Total Creatine Kinase   152   ()  U/L


 


CK-MB (CK-2)   3.2   (0.6-6.3)  ng/mL


 


Troponin I   0.01   (<0.04)  ng/mL


 


C-Reactive Protein   9.55 H   (< 5.00)  mg/L


 


B-Natriuretic Peptide    ( - 100) pg/mL


 


Total Protein   7.7   (6.4-8.9)  g/dL


 


Albumin   3.9   (3.2-5.2)  g/dL


 


Globulin   3.8   (2-4)  g/dL


 


Albumin/Globulin Ratio   1.0   (1-3)  














  07/14/17 07/14/17 Range/Units





  11:25 11:25 


 


WBC    (3.5-10.8)  10^3/ul


 


RBC    (4.0-5.4)  10^6/ul


 


Hgb    (14.0-18.0)  g/dl


 


Hct    (42-52)  %


 


MCV    (80-94)  fL


 


MCH    (27-31)  pg


 


MCHC    (31-36)  g/dl


 


RDW    (10.5-15)  %


 


Plt Count    (150-450)  10^3/ul


 


MPV    (7.4-10.4)  um3


 


Neut % (Auto)    (38-83)  %


 


Lymph % (Auto)    (25-47)  %


 


Mono % (Auto)    (1-9)  %


 


Eos % (Auto)    (0-6)  %


 


Baso % (Auto)    (0-2)  %


 


Absolute Neuts (auto)    (1.5-7.7)  10^3/ul


 


Absolute Lymphs (auto)    (1.0-4.8)  10^3/ul


 


Absolute Monos (auto)    (0-0.8)  10^3/ul


 


Absolute Eos (auto)    (0-0.6)  10^3/ul


 


Absolute Basos (auto)    (0-0.2)  10^3/ul


 


Absolute Nucleated RBC    10^3/ul


 


Nucleated RBC %    


 


INR (Anticoag Therapy)  0.94   (0.89-1.11)  


 


APTT  29.8   (26.0-36.3)  seconds


 


D-Dimer, Quantitative  327 H   (Less Than 230)  ng/mL


 


Sodium    (133-145)  mmol/L


 


Potassium    (3.5-5.0)  mmol/L


 


Chloride    (101-111)  mmol/L


 


Carbon Dioxide    (22-32)  mmol/L


 


Anion Gap    (2-11)  mmol/L


 


BUN    (6-24)  mg/dL


 


Creatinine    (0.67-1.17)  mg/dL


 


Est GFR ( Amer)    (>60)  


 


Est GFR (Non-Af Amer)    (>60)  


 


BUN/Creatinine Ratio    (8-20)  


 


Glucose    ()  mg/dL


 


Lactic Acid    (0.5-2.0)  mmol/L


 


Calcium    (8.6-10.3)  mg/dL


 


Total Bilirubin    (0.2-1.0)  mg/dL


 


AST    (13-39)  U/L


 


ALT    (7-52)  U/L


 


Alkaline Phosphatase    ()  U/L


 


Total Creatine Kinase    ()  U/L


 


CK-MB (CK-2)    (0.6-6.3)  ng/mL


 


Troponin I    (<0.04)  ng/mL


 


C-Reactive Protein    (< 5.00)  mg/L


 


B-Natriuretic Peptide   21 ( - 100) pg/mL


 


Total Protein    (6.4-8.9)  g/dL


 


Albumin    (3.2-5.2)  g/dL


 


Globulin    (2-4)  g/dL


 


Albumin/Globulin Ratio    (1-3)  











Result Diagrams: 


 07/14/17 11:25





 07/14/17 11:25


Lab Statement: Any lab studies that have been ordered have been reviewed, and 

results considered in the medical decision making process.





- Radiology


  ** CXR


Xray Interpretation: Positive (See Comments) - IMPRESSION: CARDIOMEGALY


Radiology Interpretation Completed By: Radiologist





- CT


  ** CTA chest


CT Interpretation: Positive (See Comments)


CT Interpretation Completed By: Radiologist -  IMPRESSION: No definite evidence 

of pulmonary embolus. There are 2 adjacent nodules in left upper lobe which 

were likely present on previous exam of July 09, 2016. They measure 8 mm and 5 

mm. These are likely not significantly changed however further follow-up is 

suggested. Scarring and atelectasis in right lung base is noted.  Hepatic 

steatosis is present.





- EKG


  ** 10:37


EKG Interpretation: sinus tachycardia @ 109 Bpm


EKG Comparison: No Significant Change - 9/26/16





Disposition





- Course


Assessment/Plan: 71 year old male BIB EMS with SOB. He reports CP earlier today 

(none presently) and weakness. Patient states he has chronic LE edema s/p knee 

repair. He denies any diaphoresis, nausea, vomiting, or cough. PMHx is 

significant for morbid obesity, COPD, asthma, sleep apnea, HTN, and HLD.  In 

the ED course an IV access was obtained. Patient was placed in a cardiac 

monitor.  Patient was started with Lasix due to b/l LE edema. However wife 

refused the Lasix.  Labs within normal limits except for.  Troponin #1:  0.01.  

EKG shows sinus tachycardia at 109 BPM with incomplete RBBB and    w/o ST 

elevations.  CXR impression: Cardiomegaly.  Chest CT IMPRESSION: No definite 

evidence of pulmonary embolism. There are 2 adjacent nodules in left upper lobe 

which were likely present on previous exam of July 09, 2016. They measure 8 mm 

and 5 mm. These are likely not significantly changed however further follow-up 

is suggested. Scarring and atelectasis in right lung base is noted. Hepatic 

steatosis is present.  In the ED course he accepted to get the Lasix.  I 

offered to admit patient for diuresis but patient refused. H understands the 

risk of benefits and risk of admission but he refuses. He prefers to go see her 

PCP and Cardiologist. Wife agrees with plan.  I will give a Rx for lasix. He 

was recommended to return to the ED if symptoms worsen.  All questions were 

answered at patient satisfaction.  There were no further complaints or 

concerns.  Lung exam before discharge: CTA B/L. Good air exchange. No wheezing 

or crackles heard. CVS: S1 and S2 present. No murmurs appreciated. Patient is 

alert and oriented x 3. Patient is hemodynamically stable. Patient will be 

discharged home with follow up PCP in the next 2-3 day





- Differential Dx - Cardiopulmonary


Differential Diagnoses - Cardiopulmonary: CAD, CHF, Lower Resp Infection





- Diagnoses


Provider Diagnoses: 


 Bilateral leg edema, Anasarca, Pulmonary nodule








Discharge





- Discharge Plan


Condition: Stable


Disposition: HOME


Prescriptions: 


Furosemide TAB* [Lasix TAB*] 40 mg PO DAILY #10 tab


Patient Education Materials:  Leg Edema (ED), Edema (ED)


Referrals: 


Rodrigo Pabon MD [Primary Care Provider] - 2 Days





The documentation as recorded by the Bacilio arcos Auryana accurately 

reflects the service I personally performed and the decisions made by Issac hagan Walter, MD.

## 2017-07-14 NOTE — RAD
HISTORY: Shortness of breath



COMPARISONS: September 25, 2016



VIEWS: 2: Frontal and lateral views of the chest.



FINDINGS:

CARDIOMEDIASTINAL SILHOUETTE: The cardiac silhouette is enlarged. The cardiomediastinal

silhouette is otherwise normal.

CHUCK: The chuck are normal.

PLEURA: The costophrenic angles are sharp. No pleural abnormalities are noted.

LUNG PARENCHYMA: The lungs are clear.

ABDOMEN: The upper abdomen is clear. There is no subphrenic gas.

BONES AND SOFT TISSUES: The patient is status post anterior cervical fusion

OTHER: None.



IMPRESSION:

CARDIOMEGALY

## 2017-09-21 ENCOUNTER — HOSPITAL ENCOUNTER (INPATIENT)
Dept: HOSPITAL 25 - OR | Age: 72
LOS: 2 days | Discharge: HOME | DRG: 516 | End: 2017-09-23
Attending: NEUROLOGICAL SURGERY | Admitting: NEUROLOGICAL SURGERY
Payer: MEDICARE

## 2017-09-21 DIAGNOSIS — Z87.891: ICD-10-CM

## 2017-09-21 DIAGNOSIS — E66.01: ICD-10-CM

## 2017-09-21 DIAGNOSIS — M47.12: ICD-10-CM

## 2017-09-21 DIAGNOSIS — Z96.653: ICD-10-CM

## 2017-09-21 DIAGNOSIS — M19.90: ICD-10-CM

## 2017-09-21 DIAGNOSIS — J44.9: ICD-10-CM

## 2017-09-21 DIAGNOSIS — Z88.2: ICD-10-CM

## 2017-09-21 DIAGNOSIS — N40.0: ICD-10-CM

## 2017-09-21 DIAGNOSIS — Z98.1: ICD-10-CM

## 2017-09-21 DIAGNOSIS — Z88.8: ICD-10-CM

## 2017-09-21 DIAGNOSIS — I10: ICD-10-CM

## 2017-09-21 DIAGNOSIS — Z89.419: ICD-10-CM

## 2017-09-21 DIAGNOSIS — E78.5: ICD-10-CM

## 2017-09-21 DIAGNOSIS — Z90.49: ICD-10-CM

## 2017-09-21 DIAGNOSIS — Z82.49: ICD-10-CM

## 2017-09-21 DIAGNOSIS — E11.42: ICD-10-CM

## 2017-09-21 DIAGNOSIS — K58.9: ICD-10-CM

## 2017-09-21 DIAGNOSIS — M48.06: Primary | ICD-10-CM

## 2017-09-21 PROCEDURE — 01NB0ZZ RELEASE LUMBAR NERVE, OPEN APPROACH: ICD-10-PCS | Performed by: NEUROLOGICAL SURGERY

## 2017-09-21 PROCEDURE — 85025 COMPLETE CBC W/AUTO DIFF WBC: CPT

## 2017-09-21 PROCEDURE — 36415 COLL VENOUS BLD VENIPUNCTURE: CPT

## 2017-09-21 PROCEDURE — 94760 N-INVAS EAR/PLS OXIMETRY 1: CPT

## 2017-09-21 PROCEDURE — G0378 HOSPITAL OBSERVATION PER HR: HCPCS

## 2017-09-21 PROCEDURE — 80048 BASIC METABOLIC PNL TOTAL CA: CPT

## 2017-09-21 PROCEDURE — 72100 X-RAY EXAM L-S SPINE 2/3 VWS: CPT

## 2017-09-21 RX ADMIN — CLONIDINE HYDROCHLORIDE SCH MG: 0.1 TABLET ORAL at 21:29

## 2017-09-21 RX ADMIN — CETIRIZINE HYDROCHLORIDE SCH MG: 10 TABLET, FILM COATED ORAL at 17:58

## 2017-09-21 RX ADMIN — INSULIN GLARGINE SCH UNITS: 100 INJECTION, SOLUTION SUBCUTANEOUS at 21:29

## 2017-09-21 RX ADMIN — INSULIN LISPRO SCH UNIT: 100 INJECTION, SOLUTION INTRAVENOUS; SUBCUTANEOUS at 17:58

## 2017-09-21 RX ADMIN — HYDROCODONE BITARTRATE AND ACETAMINOPHEN PRN TAB: 5; 325 TABLET ORAL at 21:28

## 2017-09-21 RX ADMIN — FINASTERIDE SCH MG: 5 TABLET, FILM COATED ORAL at 17:58

## 2017-09-21 NOTE — RAD
HISTORY: Decompressive laminectomy



COMPARISONS: August 24, 2017 MRI



VIEWS: Portable intraoperative view of the lumbar spine performed for localization during

spinal surgery at 8:31 AM.





FINDINGS:

A metallic probe is noted opposite of L3-L4, and from L5 as the last lumbar type vertebral

body.





IMPRESSION: 

LIMITED PORTABLE VIEW OF THE SPINE FOR LOCALIZATION DURING SPINAL SURGERY

## 2017-09-21 NOTE — CONS
CC:  Dr. Pabon; Dr. Lewis *

 

CONSULTATION REPORT:

 

DATE OF CONSULT:  09/21/17

 

PRIMARY CARE PROVIDER:  Dr. Pabon.

 

ATTENDING PHYSICIAN WHILE IN THE HOSPITAL:  Dr. Tal Solis  (report 
dictated by Radha Kaba NP).

 

REQUESTING PHYSICIAN FOR CONSULT:  Dr. Lewis.

 

REASON FOR MEDICAL CONSULT:  Evaluation and medical management of comorbid 
medical conditions.

 

HISTORY OF PRESENT ILLNESS:  Mr. Terwilliger is a 71-year-old male patient with 
multiple medical problems, who presented to Dr. Lewis's services for 
progressive worsening right lower extremity weakness, was found to have 
significant stenosis at L2-3, L3-4, L4-5.  It was felt that he would benefit 
from an elective decompressive laminectomy and it was felt that he would 
benefit from a decompressive surgery because of the worsening weakness and pain 
that he was having in his back.  He underwent the procedure today.  He was 
evaluated in the postoperative setting.  He says he is feeling well.  He still 
has some significant weakness in that right leg. He denies having any chest 
pain.  He denies having any shortness of breath.  He says that he is not having 
any abdominal discomfort.  No nausea.  He says he has not been vomiting.  He 
denies having any trouble with urination.  He says that he does feel 
lightheaded.  He feels little tired, but because of his medical complexity, we 
were asked to evaluate in consult.

 

PAST MEDICAL HISTORY:  Significant for:

1.  Diabetes.

2.  Arthritis.

3.  Hypertension.

4.  Obesity.

5.  Hyperlipidemia.

 

PAST SURGICAL HISTORY:

1.  He has had a left total knee replacement and a right total knee replacement.

2.  He has had an anterior cervical diskectomy and fusion.

3.  He has had a laminectomy done today.

4.  He has had a laparoscopic cholecystectomy.

 

MEDICATIONS:  His home medications include:

1.  Gas-X 160 mg p.o. q.p.m.

2.  Saline nasal spray, 1 spray both nares every 4 hours.

3.  Loniten _____ mg p.o. q.a.m.

4.  Cozaar 100 mg a day.

5.  Lantus 90 units subcu b.i.d.

6.  Insulin aspart sliding scale as directed.

7.  Lasix 20 mg a day.

8.  Avodart 0.5 mg p.o. daily.

9.  Colace 100 mg p.o. daily as needed.

10.  Klonopin 0.3 mg p.o. b.i.d.

11.  B12 1000 mcg p.o. daily.

12.  Charcoal 1 to 2 capsules p.o. daily as needed.

13.  Cetirizine 10 mg p.o. daily.

14.  Aspirin 81 mg daily.

15.  Tylenol 1000 mg p.o. daily.

 

ALLERGIES TO MEDICATIONS:  Include AMLODIPINE, ATENOLOL, SYNTHROID, 
SPIRONOLACTONE, AMOXICILLIN, BACLOFEN, ENALAPRIL, NEXIUM, GLIPIZIDE, SINGULAIR, 
OXYBUTYNIN, PAXIL, AMBIEN, LIPITOR, PROTONIX, SIMVASTATIN, HYDROCHLOROTHIAZIDE, 
SULFA ANTIBIOTICS, and BETA BLOCKERS.

 

FAMILY HISTORY:  His father had a history of an MI.  His mother's history, he 
specifically denied the patient's mother having any heart disease or any 
cancers.

 

SOCIAL HISTORY:  He is a former smoker; he does not smoke anymore.  He does not 
drink alcohol.  He is , with children.  Surrogate decision maker is his 
wife.

 

REVIEW OF SYSTEMS:  There is no documented fever.  He denied having any 
significant weight change.  There was no double vision.  He denies having any 
ear discharge. There was no rhinorrhea.  No sore throat.  No thyroid 
enlargement.  He denied having any chest pain.  No orthopnea, no nocturnal 
dyspnea.  There was no abdominal pain.  There was no nausea, no vomiting.  
There was no dysuria, no frequency. There was no loss of consciousness.  No 
pruritus and no skin ulceration.  Review of 14 systems completed, all others 
negative.

 

PHYSICAL EXAM:  Reveals vital signs blood pressure _____, pulse 100, 
respirations 14, O2 sat 96%, temperature 97.2  General:  At this time, Mr. Terwilliger is a 71- year-old male patient.  He is sitting in the postop bed.  
He does not appear to be in any acute distress.  HEENT:  Head is atraumatic, 
normocephalic.  Eyes:  EOMs intact.  Sclerae anicteric.  Not pale.  Throat:  
Oral mucosa appears to be dry.  No oropharyngeal erythema.  Neck:  Supple.  
Heart:  Sounds S1 and S2.  Regular rate and rhythm.  No murmurs, rubs, or 
gallops.  Lungs were clear to auscultation bilaterally.  No wheezes, rales, or 
rhonchi.  Abdomen:  Soft, flat.  Bowel sounds hypoactive.  Extremities:  Pulses 
2+ throughout.  He has limited range of motion in the right lower extremity.  
He is unable to lift it off the bed.  He does have dorsi and plantar flexion.  
He has sensation intact bilaterally.  He did have 5/5 strength in the upper 
extremities.  Neurologically, he is awake, alert, and oriented x3.  No gross 
focal deficits.  Skin is intact with exception he has an incision to his lumbar 
spine which is covered in ABD and is clean, dry, and intact.

 

DIAGNOSTIC STUDIES/LAB DATA:  Today revealed a WBC of 8.3, RBC of 5.32, 
hemoglobin 15.6, hematocrit of 47, platelet count 133.  The INR was 0.94.  
Sodium 139, potassium 4.4, chloride 100, bicarb of 32, BUN 16, creatinine of 
0.93.  AST 18 and ALT 26.  His calcium is 9.7.  Urine was obtained; it was 
negative.

 

Chest x-ray obtained showed mildly enlarged cardiac silhouette.

 

He had an echo done in July of 2017, which showed EF of 50% to 55%.  Old 
medical records reviewed.

 

ASSESSMENT AND PLAN:  Mr. Terwilliger is a 71-year-old male patient with 
multiple medical problems coming into the neurosurgical services today for an 
elective lumbar laminectomy.  We were asked to evaluate in consult.  Our 
recommendations at this point are:

 

1.  Status post lumbar decompressive laminectomy.  I will defer the management 
of this to Dr. Lewis and his team.

2.  Diabetes.  I will recommend putting him on sliding scale and continue his 
Lantus.

3.  Hypertension.  I will recommend continuing his minoxidil, his Klonopin, and 
I would also continue his Lasix as well.  His blood pressure postop is in the 
160s to 150s.

4.  History of arthritis.  Continue meds as prescribed.

5.  History of hyperlipidemia.  To follow with his primary.

6.  History of benign prostatic hyperplasia.  Continue Avodart.

7.  Code status.  He is full code.

8.  DVT prophylaxis.  Defer to primary team.

9.  Fluids, electrolytes, and nutrition.  I would recommend a heart-healthy 
diet.

 

TIME SPENT:  On the consult was 60 minutes, greater than half the time was 
spent face-to-face with the patient obtaining my history and physical, the 
other half of the time spent going over the plan of care with the patient and 
implementing my plan of care.

 

I did discuss the plan of care with my attending, Dr. Solis; he is in 
agreement.

 

____________________________________ RADHA KABA NP

 

[id number]/CPS #: [id number]

MADIE

## 2017-09-22 LAB
ANION GAP SERPL CALC-SCNC: 4 MMOL/L (ref 2–11)
BUN SERPL-MCNC: 20 MG/DL (ref 6–24)
BUN/CREAT SERPL: 18.5 (ref 8–20)
CALCIUM SERPL-MCNC: 8.6 MG/DL (ref 8.6–10.3)
CHLORIDE SERPL-SCNC: 100 MMOL/L (ref 101–111)
GLUCOSE SERPL-MCNC: 218 MG/DL (ref 70–100)
HCO3 SERPL-SCNC: 33 MMOL/L (ref 22–32)
HCT VFR BLD AUTO: 39 % (ref 42–52)
HGB BLD-MCNC: 13.1 G/DL (ref 14–18)
MCH RBC QN AUTO: 29 PG (ref 27–31)
MCHC RBC AUTO-ENTMCNC: 33 G/DL (ref 31–36)
MCV RBC AUTO: 88 FL (ref 80–94)
POTASSIUM SERPL-SCNC: 4.6 MMOL/L (ref 3.5–5)
RBC # BLD AUTO: 4.45 10^6/UL (ref 4–5.4)
SODIUM SERPL-SCNC: 137 MMOL/L (ref 133–145)
WBC # BLD AUTO: 10.2 10^3/UL (ref 3.5–10.8)

## 2017-09-22 RX ADMIN — HYDROCODONE BITARTRATE AND ACETAMINOPHEN PRN TAB: 5; 325 TABLET ORAL at 21:41

## 2017-09-22 RX ADMIN — FINASTERIDE SCH MG: 5 TABLET, FILM COATED ORAL at 17:37

## 2017-09-22 RX ADMIN — INSULIN LISPRO SCH UNIT: 100 INJECTION, SOLUTION INTRAVENOUS; SUBCUTANEOUS at 12:36

## 2017-09-22 RX ADMIN — CLONIDINE HYDROCHLORIDE SCH MG: 0.1 TABLET ORAL at 21:41

## 2017-09-22 RX ADMIN — HYDROCODONE BITARTRATE AND ACETAMINOPHEN PRN TAB: 5; 325 TABLET ORAL at 14:29

## 2017-09-22 RX ADMIN — INSULIN GLARGINE SCH UNITS: 100 INJECTION, SOLUTION SUBCUTANEOUS at 09:19

## 2017-09-22 RX ADMIN — CLONIDINE HYDROCHLORIDE SCH MG: 0.1 TABLET ORAL at 09:14

## 2017-09-22 RX ADMIN — INSULIN GLARGINE SCH UNITS: 100 INJECTION, SOLUTION SUBCUTANEOUS at 21:43

## 2017-09-22 RX ADMIN — FUROSEMIDE SCH MG: 40 TABLET ORAL at 09:16

## 2017-09-22 RX ADMIN — INSULIN LISPRO SCH UNIT: 100 INJECTION, SOLUTION INTRAVENOUS; SUBCUTANEOUS at 17:36

## 2017-09-22 RX ADMIN — INSULIN LISPRO SCH UNIT: 100 INJECTION, SOLUTION INTRAVENOUS; SUBCUTANEOUS at 09:12

## 2017-09-22 RX ADMIN — CETIRIZINE HYDROCHLORIDE SCH MG: 10 TABLET, FILM COATED ORAL at 17:36

## 2017-09-22 RX ADMIN — MINOXIDIL SCH MG: 2.5 TABLET ORAL at 09:17

## 2017-09-22 RX ADMIN — LOSARTAN POTASSIUM SCH MG: 25 TABLET, FILM COATED ORAL at 09:15

## 2017-09-22 RX ADMIN — HYDROCODONE BITARTRATE AND ACETAMINOPHEN PRN TAB: 5; 325 TABLET ORAL at 06:13

## 2017-09-22 NOTE — PN
Progress Note





- Progress Note


Date of Service: 09/22/17


SOAP: 


Subjective:


[]POD # 1


Doing well


Pre op leg pain better











Objective:


[]Moderate drain output


Neuro intact








Assessment:


[]Satis post op course








Plan:


[]Will convert to inpatient secondary to drain output

## 2017-09-22 NOTE — PN
Subjective


Date of Service: 09/22/17


Interval History: 








Pt reports he feels "pretty good" today. He reports his pain is well 

controlled. No fevers or chills. No N/V. Reports good appetite. Has been 

ambulating to bathroom today. 











Objective


Active Medications: 








Acetaminophen (Tylenol Tab*)  650 mg PO Q4H PRN


   PRN Reason: PAIN


Hydrocodone Bitart/Acetaminophen (Norco 5-325 Tab*)  2 tab PO Q4H PRN


   PRN Reason: marked pain


   Last Admin: 09/22/17 14:29 Dose:  2 tab


Cetirizine HCl (Zyrtec*)  10 mg PO QPM [facility]


   Last Admin: 09/21/17 17:58 Dose:  10 mg


Clonidine HCl (Catapres Tab*)  0.3 mg PO BID [facility]


   Last Admin: 09/22/17 09:14 Dose:  0.3 mg


Dextrose (D50w Syringe 50 Ml*)  12.5 gm IV PUSH .FOR FS < 60 - SS PRN


   PRN Reason: FS < 60


Docusate Sodium (Colace Cap*)  100 mg PO DAILY PRN


   PRN Reason: CONSTIPATION


Finasteride (Proscar Tab*)  5 mg PO QPM [facility]


   PRN Reason: Protocol


   Last Admin: 09/21/17 17:58 Dose:  5 mg


Furosemide (Lasix Tab*)  20 mg PO QAM [facility]


   Last Admin: 09/22/17 09:16 Dose:  20 mg


Lactated Ringer's (Lactated Ringers 1000 Ml Bag*)  1,000 mls @ 75 mls/hr IV 

.per rate [facility]


Insulin Glargine (Lantus(*))  90 units SUBCUT BID [facility]


   Last Admin: 09/22/17 09:19 Dose:  90 units


Insulin Human Lispro (Humalog*)  0 units SUBCUT AC [facility]


   PRN Reason: Protocol


   Last Admin: 09/22/17 12:36 Dose:  6 unit


Losartan Potassium (Cozaar Tab*)  100 mg PO QAM [facility]


   Last Admin: 09/22/17 09:15 Dose:  100 mg


Minoxidil (Loniten Tab*)  5 mg PO QAM [facility]


   Last Admin: 09/22/17 09:17 Dose:  5 mg


Ondansetron HCl (Zofran Inj*)  4 mg IV Q6H PRN


   PRN Reason: NAUSEA/VOMITING








 Vital Signs











  09/21/17 09/21/17 09/21/17





  18:31 19:46 21:28


 


Temperature 98.2 F  


 


Pulse Rate 114  


 


Respiratory 18 18 18





Rate   


 


Blood Pressure 149/57  





(mmHg)   


 


O2 Sat by Pulse 90  





Oximetry   














  09/21/17 09/21/17 09/22/17





  23:18 23:44 02:38


 


Temperature 99.2 F  


 


Pulse Rate 107 106 


 


Respiratory 16  





Rate   


 


Blood Pressure 153/60  





(mmHg)   


 


O2 Sat by Pulse 91 97 97





Oximetry   














  09/22/17 09/22/17 09/22/17





  03:43 06:13 07:25


 


Temperature 98.1 F  97.9 F


 


Pulse Rate 97  98


 


Respiratory 18 18 17





Rate   


 


Blood Pressure 159/68  148/59





(mmHg)   


 


O2 Sat by Pulse 96  94





Oximetry   














  09/22/17 09/22/17 09/22/17





  08:00 08:13 11:46


 


Temperature   97.9 F


 


Pulse Rate   91


 


Respiratory 24 18 24





Rate   


 


Blood Pressure   121/55





(mmHg)   


 


O2 Sat by Pulse 95  95





Oximetry   














  09/22/17 09/22/17 09/22/17





  14:29 15:39 16:29


 


Temperature  97.5 F 


 


Pulse Rate  89 


 


Respiratory 18 18 18





Rate   


 


Blood Pressure  131/61 





(mmHg)   


 


O2 Sat by Pulse  95 





Oximetry   











Oxygen Devices in Use Now: None


Appearance: obese male sitting up in bed in NAD. A+O x3


Eyes: No Scleral Icterus, PERRLA


Ears/Nose/Mouth/Throat: NL Teeth, Lips, Gums, Mucous Membranes Moist


Neck: NL Appearance and Movements; NL JVP


Respiratory: Symmetrical Chest Expansion and Respiratory Effort, Clear to 

Auscultation


Cardiovascular: NL Sounds; No Murmurs; No JVD, RRR, No Edema


Abdominal: NL Sounds; No Tenderness; No Distention


Lymphatic: No Cervical Adenopathy


Extremities: No Edema, No Clubbing, Cyanosis


Neurological: Alert and Oriented x 3, NL Sensation, NL Gait, NL Muscle Strength 

and Tone


Lines/Tubes/Other Access: Clean, Dry and Intact Peripheral IV, Clean, Dry and 

Intact Other Access - [place] drain - draing sero-sangeous fluid


Nutrition: Taking PO's


Result Diagrams: 


 09/22/17 06:29





 09/22/17 06:29





Assess/Plan/Problems-Billing


Assessment: Mr. Terwilliger is a 72 yo male with a PMH of DM2, Obesity, HTN, 

arthritis, hx of anterior cervical diskectomy & fusion, who underwent an 

elective decompressive laminectomy. Hospital Medicine was asked to co-medical 

manage his co-morbidities. 











- Patient Problems


(1) S/P laminectomy


Comment: 


POD #2


Dispo per Dr. Lewis


Switched to inpatient today 2nd to moderate [place] drainage. 


Pain controlled well. Continue current regimen with bowel meds   





(2) Diabetes


Comment: 


- Improving; continue home Lantus 80 units BID and continue lispro SS. [facility] 

[facility]   





(3) IBS (irritable bowel syndrome)


Comment: 


- not an active issue   





(4) HTN (hypertension)


Comment: 


- stable. continue home meds (clonidine, lasix, losartan, minoxidil)   





(5) DVT prophylaxis


Comment: 


SCDs   





(6) Full code status





Status and Disposition: 





Inpatient s/p lami, Dispo per Neurosurgey team.

## 2017-09-23 VITALS — SYSTOLIC BLOOD PRESSURE: 149 MMHG | DIASTOLIC BLOOD PRESSURE: 68 MMHG

## 2017-09-23 LAB
ANION GAP SERPL CALC-SCNC: 2 MMOL/L (ref 2–11)
BUN SERPL-MCNC: 20 MG/DL (ref 6–24)
BUN/CREAT SERPL: 23 (ref 8–20)
CALCIUM SERPL-MCNC: 8.6 MG/DL (ref 8.6–10.3)
CHLORIDE SERPL-SCNC: 100 MMOL/L (ref 101–111)
GLUCOSE SERPL-MCNC: 95 MG/DL (ref 70–100)
HCO3 SERPL-SCNC: 34 MMOL/L (ref 22–32)
HCT VFR BLD AUTO: 38 % (ref 42–52)
HGB BLD-MCNC: 12.8 G/DL (ref 14–18)
MCH RBC QN AUTO: 29 PG (ref 27–31)
MCHC RBC AUTO-ENTMCNC: 33 G/DL (ref 31–36)
MCV RBC AUTO: 88 FL (ref 80–94)
POTASSIUM SERPL-SCNC: 3.8 MMOL/L (ref 3.5–5)
RBC # BLD AUTO: 4.38 10^6/UL (ref 4–5.4)
SODIUM SERPL-SCNC: 136 MMOL/L (ref 133–145)
WBC # BLD AUTO: 8.8 10^3/UL (ref 3.5–10.8)

## 2017-09-23 RX ADMIN — INSULIN LISPRO SCH: 100 INJECTION, SOLUTION INTRAVENOUS; SUBCUTANEOUS at 08:11

## 2017-09-23 RX ADMIN — LOSARTAN POTASSIUM SCH MG: 25 TABLET, FILM COATED ORAL at 08:32

## 2017-09-23 RX ADMIN — INSULIN GLARGINE SCH UNITS: 100 INJECTION, SOLUTION SUBCUTANEOUS at 08:33

## 2017-09-23 RX ADMIN — HYDROCODONE BITARTRATE AND ACETAMINOPHEN PRN TAB: 5; 325 TABLET ORAL at 03:36

## 2017-09-23 RX ADMIN — MINOXIDIL SCH MG: 2.5 TABLET ORAL at 08:33

## 2017-09-23 RX ADMIN — CLONIDINE HYDROCHLORIDE SCH MG: 0.1 TABLET ORAL at 08:32

## 2017-09-23 RX ADMIN — FUROSEMIDE SCH MG: 40 TABLET ORAL at 08:32

## 2017-09-23 NOTE — PN
Progress Note





- Progress Note


Date of Service: 09/23/17


SOAP: 


Subjective:


[]POD # 2


Feels better


Has ambulated


Drainage decreased








Objective:


[]Neuro intact


Drain removed








Assessment:


[]


Satis post op course





Plan:


[]D/C today


D/C Instructions given

## 2017-09-23 NOTE — OP
OPERATIVE REPORT:

 

DATE OF OPERATION:  09/21/17

 

DATE OF BIRTH:  [birth year]

 

PRIMARY SURGEON:  Carson Lewis MD

 

FIRST ASSISTANT:  DAVID Moreland

 

ANESTHESIA:  General.

 

PRE-OP DIAGNOSIS:  Lumbar spinal stenosis, L2-3, L3-4, L4-5.

 

POST-OP DIAGNOSIS:  Lumbar spinal stenosis, L2-3, L3-4, L4-5.

 

OPERATIVE PROCEDURE:  Decompressive lumbar laminectomy, L2-3, L3-4, L4-5.

 

DESCRIPTION OF PROCEDURE:  After satisfactory general anesthesia was obtained, the patient was place
d on the operating table in a prone position with the chest supported on the Maynor frame and the ba
ck slightly flexed.  The lumbar region was clipped, prepped, and draped in a sterile manner for lumb
ar laminectomy and a skin incision outlined from L2-L5.  This incision was infiltrated with 1% Xyloc
ewa with epinephrine, after which it was turned down sharply to the level of the lumbar fascia.  Th
e fascia was divided along the spinous processes from L2-L5 and the paraspinal musculature was strip
ped away from these posterior elements using the periosteal elevator and monopolar cautery.  The pat
ient was morbidly obese and the exposure was quite deep.  The posterior elements were stripped free 
of paraspinal musculature and the Versa-Trac retractor employed for self-retaining retractor assista
nce.  An intraoperative x-ray was obtained verifying interspace localization, after which a decompre
ssion was carried out, initially at the L2-3 level.  The spinous processes of L2, L3, L4, and the griffith
perior aspect of the spinous process of L5 were removed with a combination of the Fernando rib shear 
and Leksell rongeurs.  Beginning at the L2 level, the remaining portion of the base of L2 spinous pr
ocess and the inferior aspect of the lamina was thinned out with a Midas Imer drill and a decompressi
on carried out with a Kerrison rongeur.  This was carried superiorly until the attachment of ligamen
reji flavum was taken down.  The pathology at this level was noted to be a combination of ligamentous
 thickening as well as bony hypertrophy.  The decompression was carried out laterally and inferiorly
 until both L3 nerve roots are noted to be free in their course. Attention was then directed to the 
L3-4 level, where a similar decompression was carried out.  Similar findings of ligamentum hypertrop
hy and bony hypertrophy were found.  At the conclusion of the decompression at this level, both L4 n
erve roots were noted to be free in their course.  At the L4-5 level, the changes were somewhat less
 extensive, but there was still moderate ligamentum thickening as well as bony hypertrophy contribut
ing to stenosis.  This was decompressed until both the L5 nerve roots were noted to be free in their
 course.  Hemostasis was obtained with temporary Gelfoam and after assuring adequate hemostasis, the
 wound was thoroughly irrigated, after which a drain was placed in the epidural space and tunneled o
ut toward the left side.  The fascia was then reapproximated with 0 Vicryl suture. The subcutaneous 
tissue was closed with 3-0 Vicryl suture and the skin closed with skin clips.  The estimated blood l
oss was 400 cc and the final sponge, padding, and needle counts were correct.  The patient was taken
 to the recovery room, extubated, and in stable condition.

 

 [id number]/CPS #: [id number]

## 2018-04-13 ENCOUNTER — HOSPITAL ENCOUNTER (EMERGENCY)
Dept: HOSPITAL 25 - ED | Age: 73
Discharge: ANOTHER HOSPITAL | End: 2018-04-13
Payer: MEDICARE

## 2018-04-13 VITALS — SYSTOLIC BLOOD PRESSURE: 128 MMHG | DIASTOLIC BLOOD PRESSURE: 96 MMHG

## 2018-04-13 DIAGNOSIS — I10: ICD-10-CM

## 2018-04-13 DIAGNOSIS — Z87.891: ICD-10-CM

## 2018-04-13 DIAGNOSIS — E78.00: ICD-10-CM

## 2018-04-13 DIAGNOSIS — E11.9: ICD-10-CM

## 2018-04-13 DIAGNOSIS — R09.02: ICD-10-CM

## 2018-04-13 DIAGNOSIS — J44.9: ICD-10-CM

## 2018-04-13 DIAGNOSIS — R91.8: Primary | ICD-10-CM

## 2018-04-13 LAB
BASOPHILS # BLD AUTO: 0.1 10^3/UL (ref 0–0.2)
EOSINOPHIL # BLD AUTO: 0.3 10^3/UL (ref 0–0.6)
HCT VFR BLD AUTO: 43 % (ref 42–52)
HGB BLD-MCNC: 14.1 G/DL (ref 14–18)
INR PPP/BLD: 1.02 (ref 0.77–1.02)
LYMPHOCYTES # BLD AUTO: 1.3 10^3/UL (ref 1–4.8)
MCH RBC QN AUTO: 28 PG (ref 27–31)
MCHC RBC AUTO-ENTMCNC: 33 G/DL (ref 31–36)
MCV RBC AUTO: 85 FL (ref 80–94)
MONOCYTES # BLD AUTO: 0.6 10^3/UL (ref 0–0.8)
NEUTROPHILS # BLD AUTO: 5.1 10^3/UL (ref 1.5–7.7)
NRBC # BLD AUTO: 0 10^3/UL
NRBC BLD QL AUTO: 0
PLATELET # BLD AUTO: 161 10^3/UL (ref 150–450)
RBC # BLD AUTO: 5.04 10^6/UL (ref 4–5.4)
WBC # BLD AUTO: 7.3 10^3/UL (ref 3.5–10.8)

## 2018-04-13 PROCEDURE — 71046 X-RAY EXAM CHEST 2 VIEWS: CPT

## 2018-04-13 PROCEDURE — 96374 THER/PROPH/DIAG INJ IV PUSH: CPT

## 2018-04-13 PROCEDURE — 94640 AIRWAY INHALATION TREATMENT: CPT

## 2018-04-13 PROCEDURE — 36415 COLL VENOUS BLD VENIPUNCTURE: CPT

## 2018-04-13 PROCEDURE — 99283 EMERGENCY DEPT VISIT LOW MDM: CPT

## 2018-04-13 PROCEDURE — 83880 ASSAY OF NATRIURETIC PEPTIDE: CPT

## 2018-04-13 PROCEDURE — 80053 COMPREHEN METABOLIC PANEL: CPT

## 2018-04-13 PROCEDURE — 83605 ASSAY OF LACTIC ACID: CPT

## 2018-04-13 PROCEDURE — 84484 ASSAY OF TROPONIN QUANT: CPT

## 2018-04-13 PROCEDURE — 85025 COMPLETE CBC W/AUTO DIFF WBC: CPT

## 2018-04-13 PROCEDURE — 93005 ELECTROCARDIOGRAM TRACING: CPT

## 2018-04-13 PROCEDURE — 71275 CT ANGIOGRAPHY CHEST: CPT

## 2018-04-13 PROCEDURE — 85610 PROTHROMBIN TIME: CPT

## 2018-04-13 NOTE — ED
Michael FLETCHER Gabriel, scribed for Steven Palmer MD on 04/13/18 at 0531 .





Shortness of Breath





- HPI Summary


HPI Summary: 





This patient is a 72 year old M BIBA to [facility] with a chief complaint of SOB 

that began 3 days ago. Pt was seen by PCP for similar symptoms earlier this 

week. Pt has intermittent chest discomfort. He has CPAP that he does not use. 

Patient denies rhinorrhea, congestion, fever, wheezing, ABD pain, and n/v/d. 





- History of Current Complaint


Time Seen by Provider: 04/13/18 05:19


Hx Obtained From: Patient


Onset/Duration: Still Present


Timing: Constant


Current Severity: Moderate


Associated Signs & Symptoms: Negative - rhinorrhea, congestion, fever, CP, 

wheezing, ABD pain, and n/v/d.





- Allergy/Home Medications


Allergies/Adverse Reactions: 


 Allergies











Allergy/AdvReac Type Severity Reaction Status Date / Time


 


amlodipine Allergy Severe Shortness Verified 04/13/18 05:25





   of Breath  


 


atenolol Allergy Severe Shortness Verified 04/13/18 05:25





   of Breath  


 


atorvastatin Allergy Severe Muscle Ache Verified 04/13/18 05:25


 


pantoprazole Allergy Severe Muscle Ache Verified 04/13/18 05:25


 


simvastatin Allergy Severe Muscle Ache Verified 04/13/18 05:25


 


amoxicillin Allergy Intermediate GI Upset Verified 04/13/18 05:25


 


enalapril Allergy Intermediate Shortness Verified 04/13/18 05:25





   of Breath  


 


glipizide Allergy Intermediate Muscle Ache Verified 04/13/18 05:25


 


hydrochlorothiazide Allergy Intermediate See Comment Verified 04/13/18 05:25


 


levothyroxine Allergy Intermediate Shortness Verified 04/13/18 05:25





   of Breath  


 


spironolactone Allergy Intermediate GI Upset Verified 04/13/18 05:25


 


montelukast Allergy Mild Itching Verified 04/13/18 05:25


 


baclofen Allergy Unknown Unknown Verified 04/13/18 05:25





   Reaction  





   Details  


 


esomeprazole Allergy Unknown Unknown Verified 04/13/18 05:25





   Reaction  





   Details  


 


oxybutynin Allergy Unknown Unknown Verified 04/13/18 05:25





   Reaction  





   Details  


 


paroxetine Allergy Unknown Unknown Verified 04/13/18 05:25





   Reaction  





   Details  


 


zolpidem Allergy Unknown Unknown Verified 04/13/18 05:25





   Reaction  





   Details  


 


BETA BLOCKER Allergy Unknown Unknown Uncoded 04/13/18 05:25





   Reaction  





   Details  


 


Beta Blockers Allergy  Unknown Uncoded 04/13/18 05:25





   Reaction  





   Details  














PMH/Surg Hx/FS Hx/Imm Hx


Endocrine/Hematology History: Reports: Hx Diabetes, Hx Thyroid Disease


Cardiovascular History: Reports: Hx Hypercholesterolemia, Hx Hypertension


   Denies: Hx Angina, Hx Congestive Heart Failure, Hx Coronary Artery Disease, 

Hx Myocardial Infarction, Hx Pacemaker/ICD, Hx Valvular Heart Disease


Respiratory History: Reports: Hx Asthma - AS A CHILD(prior record), Hx Chronic 

Obstructive Pulmonary Disease (COPD), Hx Pneumonia, Hx Sleep Apnea, Other 

Respiratory Problems/Disorders - RECENT URI, JUST FINISHED ABX


GI History: Reports: Hx Irritable Bowel, Other GI Disorders - OCC STOMACH PAIN, 

NEG WORKUP PER DR GAYTAN; IBS per [place]


   Denies: Hx Ulcer


 History: Reports: Other  Problems/Disorders - BPH


   Denies: Hx Renal Disease


Musculoskeletal History: Reports: Hx Arthritis, Other Musculoskeletal History - 

CERVICAL SPONDYLOSIS


Sensory History: Reports: Hx Contacts or Glasses, Hx Deafness - [place] not deaf


   Denies: Hx Hearing Aid


Opthamlomology History: Reports: Hx Contacts or Glasses


Neurological History: Reports: Other Neuro Impairments/Disorders - BALANCE 

ISSUES, USES WALKER(states trouble seeing at night)


Psychiatric History: 


   Denies: Hx Panic Disorder





- Surgical History


Surgery Procedure, Year, and Place: lt knee replacement 2013, lt great toe 

REATTACHED 1970, GALLBLADDER.  1/29/16 ANTERIOR CERVICAL DISC AND FUSION C4-5-

6.  RIGHT KNEE REPLACEMENT 1/2017


Hx Anesthesia Reactions: No


Infectious Disease History: No


Infectious Disease History: 


   Denies: Hx Clostridium Difficile, Hx Hepatitis, Hx Human Immunodeficiency 

Virus (HIV), Hx of Known/Suspected MRSA, Hx Shingles, Hx Tuberculosis, Hx Known/

Suspected VRE, Hx Known/Suspected VRSA, History Other Infectious Disease, 

Traveled Outside the [place] in Last 30 Days





- Family History


Known Family History: Positive: Cardiac Disease





- Social History


Alcohol Use: None


Hx Substance Use: No


Substance Use Type: Reports: None


Hx Tobacco Use: Yes


Smoking Status (MU): Former Smoker


Type: Cigarettes


Amount Used/How Often: 2 PPD FOR 16 YRS


Length of Time of Smoking/Using Tobacco: 15 YRS


Have You Smoked in the Last Year: No





Review of Systems


Negative: Fever


Negative: Nasal Discharge


Positive: Chest Pain - intermittent discomfort 


Positive: Shortness Of Breath


Negative: Abdominal Pain


All Other Systems Reviewed And Are Negative: Yes





Physical Exam





- Summary


Physical Exam Summary: 





Appearance: Well appearing, no pain distress


Skin: warm, dry, reflects adequate perfusion


Head/face: normal


Eyes: EOMI, CASSIE


ENT: moist mucous mernbranes, thick nasal secretions that are brown


Neck: supple, non-tender


Respiratory: globally diminished breath sounds, no wheezes rales, or rhonchi 


Cardiovascular: tachycardic but regular,, pulses symmetrical 


Abdomen: non-tender, soft


Bowel Sounds: present


Musculoskeletal: 1-2+ bilateral LE edema


Neuro: normal, sensory motor intact, A&Ox3


 





Triage Information Reviewed: Yes


Vital Signs On Initial Exam: 


 Initial Vitals











Temp Pulse Resp BP Pulse Ox


 


 98.6 F   99   17   159/67   92 


 


 04/13/18 05:20  04/13/18 05:20  04/13/18 05:20  04/13/18 05:20  04/13/18 05:20











Vital Signs Reviewed: Yes





Diagnostics





- Vital Signs


 Vital Signs











  Temp Pulse Resp BP Pulse Ox


 


 04/13/18 05:20  98.6 F  99  17  159/67  92














- Laboratory


Lab Results: 


 Lab Results











  04/13/18 04/13/18 04/13/18 Range/Units





  05:40 05:40 05:40 


 


WBC   7.3   (3.5-10.8)  10^3/ul


 


RBC   5.04   (4.0-5.4)  10^6/ul


 


Hgb   14.1   (14.0-18.0)  g/dl


 


Hct   43   (42-52)  %


 


MCV   85   (80-94)  fL


 


MCH   28   (27-31)  pg


 


MCHC   33   (31-36)  g/dl


 


RDW   15   (10.5-15)  %


 


Plt Count   161   (150-450)  10^3/ul


 


MPV   8.1   (7.4-10.4)  um3


 


Neut % (Auto)   70.2   (38-83)  %


 


Lymph % (Auto)   17.4 L   (25-47)  %


 


Mono % (Auto)   7.6 H   (0-7)  %


 


Eos % (Auto)   3.6   (0-6)  %


 


Baso % (Auto)   1.2   (0-2)  %


 


Absolute Neuts (auto)   5.1   (1.5-7.7)  10^3/ul


 


Absolute Lymphs (auto)   1.3   (1.0-4.8)  10^3/ul


 


Absolute Monos (auto)   0.6   (0-0.8)  10^3/ul


 


Absolute Eos (auto)   0.3   (0-0.6)  10^3/ul


 


Absolute Basos (auto)   0.1   (0-0.2)  10^3/ul


 


Absolute Nucleated RBC   0   10^3/ul


 


Nucleated RBC %   0   


 


INR (Anticoag Therapy)     (0.77-1.02)  


 


Sodium    137 L  (139-145)  mmol/L


 


Potassium    4.3  (3.5-5.0)  mmol/L


 


Chloride    102  (101-111)  mmol/L


 


Carbon Dioxide    30  (22-32)  mmol/L


 


Anion Gap    5  (2-11)  mmol/L


 


BUN    20  (6-24)  mg/dL


 


Creatinine    0.83  (0.67-1.17)  mg/dL


 


Est GFR ( Amer)    117.5  (>60)  


 


Est GFR (Non-Af Amer)    91.3  (>60)  


 


BUN/Creatinine Ratio    24.1 H  (8-20)  


 


Glucose    164 H  ()  mg/dL


 


Lactic Acid     (0.5-2.0)  mmol/L


 


Calcium    9.5  (8.6-10.3)  mg/dL


 


Total Bilirubin    0.60  (0.2-1.0)  mg/dL


 


AST    16  (13-39)  U/L


 


ALT    23  (7-52)  U/L


 


Alkaline Phosphatase    84  ()  U/L


 


Troponin I    0.01  (<0.04)  ng/mL


 


B-Natriuretic Peptide  57   ( - 100) pg/mL


 


Total Protein    7.4  (6.4-8.9)  g/dL


 


Albumin    3.7  (3.2-5.2)  g/dL


 


Globulin    3.7  (2-4)  g/dL


 


Albumin/Globulin Ratio    1.0  (1-3)  














  04/13/18 04/13/18 Range/Units





  05:40 05:40 


 


WBC    (3.5-10.8)  10^3/ul


 


RBC    (4.0-5.4)  10^6/ul


 


Hgb    (14.0-18.0)  g/dl


 


Hct    (42-52)  %


 


MCV    (80-94)  fL


 


MCH    (27-31)  pg


 


MCHC    (31-36)  g/dl


 


RDW    (10.5-15)  %


 


Plt Count    (150-450)  10^3/ul


 


MPV    (7.4-10.4)  um3


 


Neut % (Auto)    (38-83)  %


 


Lymph % (Auto)    (25-47)  %


 


Mono % (Auto)    (0-7)  %


 


Eos % (Auto)    (0-6)  %


 


Baso % (Auto)    (0-2)  %


 


Absolute Neuts (auto)    (1.5-7.7)  10^3/ul


 


Absolute Lymphs (auto)    (1.0-4.8)  10^3/ul


 


Absolute Monos (auto)    (0-0.8)  10^3/ul


 


Absolute Eos (auto)    (0-0.6)  10^3/ul


 


Absolute Basos (auto)    (0-0.2)  10^3/ul


 


Absolute Nucleated RBC    10^3/ul


 


Nucleated RBC %    


 


INR (Anticoag Therapy)  1.02   (0.77-1.02)  


 


Sodium    (139-145)  mmol/L


 


Potassium    (3.5-5.0)  mmol/L


 


Chloride    (101-111)  mmol/L


 


Carbon Dioxide    (22-32)  mmol/L


 


Anion Gap    (2-11)  mmol/L


 


BUN    (6-24)  mg/dL


 


Creatinine    (0.67-1.17)  mg/dL


 


Est GFR ( Amer)    (>60)  


 


Est GFR (Non-Af Amer)    (>60)  


 


BUN/Creatinine Ratio    (8-20)  


 


Glucose    ()  mg/dL


 


Lactic Acid   1.3  (0.5-2.0)  mmol/L


 


Calcium    (8.6-10.3)  mg/dL


 


Total Bilirubin    (0.2-1.0)  mg/dL


 


AST    (13-39)  U/L


 


ALT    (7-52)  U/L


 


Alkaline Phosphatase    ()  U/L


 


Troponin I    (<0.04)  ng/mL


 


B-Natriuretic Peptide   ( - 100) pg/mL


 


Total Protein    (6.4-8.9)  g/dL


 


Albumin    (3.2-5.2)  g/dL


 


Globulin    (2-4)  g/dL


 


Albumin/Globulin Ratio    (1-3)  











Result Diagrams: 


 04/13/18 05:40





 04/13/18 05:40


Lab Statement: Any lab studies that have been ordered have been reviewed, and 

results considered in the medical decision making process.





- Radiology


  ** CXR


Radiology Interpretation Completed By: ED Physician - atelectasis in right 

base. Mass in left base with atelectasis





- EKG


  ** [id number]


Cardiac Rate: Tachycardia


EKG Rhythm: Sinus Tachycardia - at 105 BPM


EKG Interpretation: low voltage, nml axis, nml ST 





Course/Dx





- Course


Course Of Treatment: pt with chest mass and hypoxia. Has yet to have 

bronchoscopy. Increased atelectasis. Will get CTA and admit. Improved on 

oxygen. Diminished but without wheezing. Will cover with antibiotics 

empirically. D/W Dr DONATO Condon who will admit.





- Diagnoses


Differential Diagnosis/HQI/PQRI: Positive: Airway Obstruction, Bronchitis, CHF, 

COPD Exacerbation, Pneumonia, Pulmonary Embolism, Pulmonary Edema


Provider Diagnoses: 


 Mass of left lung, Hypoxia








- Physician Notifications


Discussed Care of Patient With: Javed Condon


Time Discussed With Above Provider: 07:00


Instructed by Provider To: Admit As Inpatient





Discharge





- Sign-Out/Discharge


Documenting (check all that apply): Discharge





- Discharge Plan


Condition: Fair


Disposition: ADMITTED TO [facility]


Referrals: 


Rodrigo Pabon MD [Primary Care Provider] - 





- Billing Disposition and Condition


Condition: FAIR


Disposition: [facility]





The documentation as recorded by the Michael arcos Gabriel accurately reflects 

the service I personally performed and the decisions made by me, Steven Palmer MD.

## 2018-04-13 NOTE — XMS REPORT
Philip C Terwilliger

 Created on:2018



Patient:Terwilliger, Philip C

Sex:Male

:1945

External Reference #:[id number]





Demographics







 Address  [address]



   [place] [address]

 

 Home Phone  [phone]

 

 Mobile Phone  [phone]

 

 Work Phone  [phone]

 

 Preferred Language  English

 

 Marital Status  Not  Or 

 

 Muslim Affiliation  Unknown

 

 Race  White

 

 Ethnic Group  Not  Or 









Author







 Organization  [facility]

 

 Address  [address], [address] [place] [address]

 

 Phone  [phone]









Support







 Name  Relationship  Address  Phone

 

 Terwilliger, Mary  Wife  [address]  Unavailable



     [place] [address]  









Care Team Providers







 Name  Role  Phone

 

 Rodrigo Pabon MD  Care Team Information   Unavailable

 

 Rodrigo Pabon MD  Primary Care Physician  Unavailable









Payers







 Type  Date  Identification Numbers  Payment Provider  Subscriber

 

 Medicare Primary    Policy Number: [id number]  Medicare  Philip C Terwilliger









 PayID: [id number]  [address]









 [place] [address]









 Medigap Part B    PayID: [id number]  Excellus Mary Terwilliger









 [address] 

 

 [place] [address]









 Medigap Part B  Expires: 2017  Policy Number:  Excellus Mary Terwilliger



     [id number]    









 PayID: [id number]  [address] 









 [place] [address]







Problems







 Description

 

 No Information







Family History







 Date  Family Member(s)  Problem(s)  Comments

 

   General  Non Contributory  







Social History







 Type  Date  Description  Comments

 

 Marital Status      

 

 Home Environment    Lives With  Wife

 

 Occupation    Retired  

 

 Work Status    Retired  

 

 Cigarette Use    Never Smoked Cigarettes  

 

 ETOH Use    Denies alcohol use  

 

 Smoking    Patient is a former smoker  1 1/2 - 2 PPD x30 years

 

 Recreational Drug Use    Denies Drug Use  

 

 Daily Caffeine    Consumes on average 2 cups of  



     regular coffee per day  

 

 Exercise Type/Frequency    Does not exercise  







Allergies, Adverse Reactions, Alerts







 Date  Description  Reaction  Status  Severity  Comments

 

 2013  Penicillin    active    

 

 2013  Lipitor    active    

 

 2014  Sulfa Drugs    active    

 

 2017  Synthroid    active    

 

 2017  Levothyroxine    active    

 

 2017  Atenolol    active    







Medications







 Medication  Date  Status  Form  Strength  Qnty  SIG  Indications  Ordering



                 Provider

 

 Aspirin    Active  Chewtabs  81mg    1 by mouth    Unknown



   /0000          every day    

 

 Clonidine HCL    Active  Tablets  0.3mg    1 tab by    Unknown



   /0000          mouth    



             twice a    



             day    

 

 Dutasteride    Active  Capsules  0.5mg    1 cap by    Unknown



   /0000          mouth    



             every day    

 

 Losartan    Active  Tablets  100mg    1 by mouth    Unknown



 Potassium  /0000          every day    

 

 Lantus    Active  Solution  100Unit/M    twice a    Unknown



   /0000      L    day inject    



             95 units    



             subq    

 

 Minoxidil    Active  Tablets  2.5mg    2 tab by    Unknown



   /0000          mouth    



             every day    

 

 Furosemide    Active  Tablets  20mg    1 by mouth    Unknown



   /0000          twice    



             daily    

 

 Simethicone    Active  Chewtabs  80mg    1 tab by    Unknown



   /0000          mouth four    



             times a    



             day as    



             needed    

 

 Novolog Flexpen    Active  Solution  100Unit/M    sliding    Unknown



   /    Pen-Inject  L    scale    

 

                 

 

 Oxycodone/Acetami    Hx  Tablets  5-325mg  56tab  1-2 tabs    danny Brownhen  /2013        s  by mouth    MD Maxi



   -          every 4    



             hours as    



   /2014          needed for    



             pain    

 

 Warfarin Sodium    Hx  Tablets  1mg  20tab  as    Stephanie        s  hoda German MD



   -          by phone    



                 

 

 Warfarin Sodium    Hx  Tablets  5mg  50tab  as    Stephanie        s  Maxi drummond MD



   -          currently    



             16mg per    



             day    

 

 Novolin 70/30    Hx  Suspension  (70-30)10    12u    Unknown



   /0000      0Unit/ML    subcutaneo    



             us if FS    



             greater    



             than 150    



             tid    

 

 Hydrochlorothiazi    Hx  Tablets  25mg  30tab  1 by mouth    Unknown



 de          s  every day    



   -              



                 

 

 Levothyroxine    Hx  Tablets  112mcg    1 by mouth    Unknown



 Sodium  /0000          every day    



   -              



                 

 

 Cetirizine HCL    Hx  Tablets  10mg    1 by mouth    Unknown



   /0000          every day    

 

 Chlorthalidone  00  Hx  Tablets  25mg    1 by mouth    Unknown



   /0000          every day    







Vital Signs







 Date  Vital  Result  Comment

 

 2018  BP Systolic Sitting Left Arm  182 mmHg  









 BP Diastolic Sitting Left Arm  90 mmHg  

 

 Heart Rate  107 /min  

 

 Respiratory Rate  20 /min  

 

 Height  72 inches  6'0"

 

 Weight  357.00 lb  

 

 BMI (Body Mass Index)  48.4 kg/m2  

 

 BSA (Body Surface Area)  2.73 m2  

 

 Ideal body weight in kilograms  81  

 

 O2 % BldC Oximetry  91 %  room air









 2017  BP Systolic  180 mmHg  









 BP Diastolic  90 mmHg  

 

 Height  72 inches  6'0"

 

 Weight  343.00 lb  

 

 BMI (Body Mass Index)  46.5 kg/m2  

 

 BSA (Body Surface Area)  2.68 m2  

 

 Ideal body weight in kilograms  81  









 2014  BP Systolic Sitting Right Arm  180 mmHg  









 BP Diastolic Sitting Right Arm  78 mmHg  

 

 Height  70.5 inches  5'10.50"

 

 Weight  313.00 lb  

 

 BMI (Body Mass Index)  44.3 kg/m2  

 

 BSA (Body Surface Area)  2.54 m2  









 2013  Heart Rate  96 /min  144/88









 Height  69.50 inches  5'9.50"

 

 Weight  304.00 lb  









 2013  Height  69.5 inches  5'9.50"









 Weight  303.00 lb  







Results







 Description

 

 No Information







Procedures







 Date  CPT Code  Description  Status

 

 2014  48029  Radiology, Both Knees Standing  Completed

 

 2014  40639  Radiology, Both Knees Standing  Completed

 

 2014  36933  Radiology, Distal Femur--Knee 1 Or 2 Views  Completed

 

 2013  15722  Total Knee Arthroplasty medial&amp;lateral compartments  
Completed



     w/wo garza res  







Encounters







 Type  Date  Location  Provider  CPT E/M  Dx

 

 Office Visit  2017  3:15p  Endocrinology  Edson Blanton M.D.  [id number]  E66.9









 I10

 

 E03.9

 

 E11.65

 

 G47.30

 

 H90.3

 

 Z96.653









 Office Visit  04/15/2015 10:30a  Orthopaedic Office  Pam Villalba  [id number]
  715.16



       [facility]    

 

 Office Visit  10/01/2014  2:15p  Orthopaedic Office  [address]  
[id number]  728.13

 

 Office Visit  2014  1:45p  Orthopaedic Office  Pam Villalba  [id number]
  V43.65



       [facility]    









 V43.65

 

 728.13

 

 V54.81

 

 715.16

 

 719.46







Plan of Care

Future Appointment(s):2018  9:00 am - Brett Dominguez MD at Operating Room2018 11:00 am - Lalita Almonte NP at [facility]/20/2018 - Brett Dominguez
, MDR91.1 Solitary pulmonary noduleComments:I reviewed his CT chest which shows 
a JOHANNE mass highly suspicious for malignancy. I am going to set him up with a 
bronchoscopy. The plan is to set him up for 18 at 9 AM. Needs to be NPO 
past midnight after 18. Coags will need to be checked.Follow up:2 weeks, 
with eliza.J44.9 Chronic obstructive pulmonary disease, unspecifiedNew Orders:
PFT With BronchodilatorComments:I am checking PFTs.G47.33 Obstructive sleep 
apnea (adult) (pediatric)Comments:Not interested in going back on CPAP.Z01.812 
Encounter for preprocedural laboratory examinationNew Labs:Act Partial Thrombo 
TimeProtime

## 2018-04-13 NOTE — RAD
Indication: Shortness of breath.



2 views of the chest are reviewed and compared to previous exam dated February 15, 2008.

Again noted is a left parahilar mass with some atelectasis peripherally. Cardiomegaly is

noted. Lung fields appear hyperinflated.



IMPRESSION: Left perihilar mass with some peripheral atelectasis or postobstructive

infiltrate.

## 2018-04-13 NOTE — RAD
HISTORY: Lung mass, hypoxia



COMPARISONS: February 15, 2018



TECHNIQUE: Multiple contiguous axial CT scans of the chest were obtained after the

administration of nonionic intravenous contrast, timed to the pulmonary arterial phase of

contrast enhancement.. Coronal and sagittal multiplanar reformations are also submitted

for review.        



FINDINGS:    



NECK AND THYROID: The lower neck and thyroid are unremarkable.

CHEST WALL: There is no lower cervical, axillary, or supraclavicular lymphadenopathy by

size criteria.



HEART AND PERICARDIUM: The heart is unremarkable.

AORTA AND PULMONARY VASCULATURE: There is no pulmonary arterial filling defect to suggest

pulmonary embolism. There is no linear filling defect within the aorta to suggest aortic

dissection. There is atherosclerosis of the thoracic aorta.



MEDIASTINUM: There are multiple subcentimeter short axis prevascular lymph nodes. This

includes a rounded prevascular lymph node on axial image 26 that measures up to 0.9 cm in

short axis.

CHUCK: There is no hilar lymphadenopathy by size criteria.



AIRWAY AND ESOPHAGUS: The airway is unremarkable, without endobronchial filling defect.

The esophagus is grossly normal.

LUNG PARENCHYMA: Again noted is a rounded mass of the left upper lobe measuring

approximately 3.4 x 3.1 cm in size.

PLEURA: No pleural abnormalities are noted.



UPPER ABDOMEN: There is fatty infiltration of the liver.

BONES AND SOFT TISSUES: Degenerative changes are noted.



OTHER: None.



IMPRESSION: 

1.  NO PULMONARY ARTERIAL FILLING DEFECT TO SUGGEST PULMONARY EMBOLISM.

2.  AGAIN NOTED IS A MASS OF THE LEFT UPPER LOBE.

3.  THERE IS A ROUNDED PREVASCULAR LYMPH NODE THAT IS AT THE UPPER LIMITS OF NORMAL IN

SIZE. THIS IS INDETERMINATE BASED ON IMAGING APPEARANCE.

## 2018-04-13 NOTE — ED
IPhong Angela, scribed for Robert Davenport MD on 04/13/18 at 1257 .





Progress





- Progress Note


Progress Note: 





This pt was not signed out to me.


Dr. Condon, hospitalist, has asked me to print the discharge instructions for 

the pt. I was not involved in the care of this pt. 





Course/Dx





- Diagnoses


Provider Diagnoses: 


 Hypoxia








Discharge





- Sign-Out/Discharge


Documenting (check all that apply): Discharge - discharge to home





- Discharge Plan


Condition: Stable


Disposition: HOME


Patient Education Materials:  Hypoxia (ED)


Referrals: 


Rodrigo Pabon MD [Primary Care Provider] - 3 Days


Additional Instructions: 


Please follow up with your primary care provider.





RETURN TO THE ED FOR ANY NEW OR WORSENING SYMPTOMS.





The documentation as recorded by the Phong arcos Angela accurately reflects 

the service I personally performed and the decisions made by me, Robert Davenport MD.

## 2018-04-13 NOTE — CONS
CONSULTATION REPORT:

 

DATE OF CONSULT:  04/13/18

 

PR[facility] C[facility] PROVID[facility]  [facility]

 

HEALTHCARE PROXY:  His wife.

 

CODE STATUS:  DNR.  Discussed with the patient and his wife and his son.

 

CONSULTATION REQUESTED BY:  Emergency room.

 

REASON FOR CONSULTATION:  Hypoxia, mass in chest.

 

History obtained from interview with the patient, review of past medical 
records and discussion with the patient's oncologist's office.

 

HISTORY OF PRESENT ILLNESS:  This is a 72-year-old man, past medical history 
includes morbid obesity as well as known 3 cm mass in the left upper lobe of 
his lung, discovered in January.  He has generally been feeling ill throughout 
the last couple of months; however, increased shortness of breath over the last 
3 days.  His baseline exercise tolerance was about 20 feet limited by bilateral 
knee pain, but noticed that he has been increasingly short of breath, walking 
to the car.  He woke up at 1 a.m. on the night of admission coughing and had 
difficulty returning to sleep.  He just noted cough with clear phlegm, but no 
fevers or chills.  No nausea, vomiting,  symptoms, sick contacts, although 
his granddaughter had a runny nose 2 days prior.  He has had chronic 
constipation.  He sleeps in a recliner where he has been sleeping for the last 
8 to 10 years because "it is a bad habit" and "does not like sleeping lying 
flat."  He has lower extremity swelling that has not changed in severity.  He 
notes he has been taking his medications at home without fail and he uses a low 
salt diet; however, this does include ham on sandwiches when queried in more 
detail and unclear what other salt products.  EMS was summoned to his house, 
where they found him 89% to 90% on room air.  The patient ambulated to the 
ambulance and was transferred to [facility].  When seen by this author, he is 
feeling back to his baseline with no complaints.  He denied chest pain at any 
time.  His mass was discovered on 02/15/18 and 2 weeks prior,  ______ in 
[place] performed a biopsy.  It was thought to be nondiagnostic with another 
biopsy planned a week from today.  CTA  of the chest was performed in the 
emergency room with details indicated below.

 

PAST MEDICAL HISTORY:  Includes type 2 diabetes; arthritis; hypertension; 
obesity; hyperlipidemia; history of obstructive sleep apnea, however, no longer 
has a machine, no longer uses it.

 

PAST SURGICAL HISTORY:  Includes left total knee and right total knee 
replacements, anterior cervical discectomy and fusion, laminectomy, 
laparoscopic cholecystectomy.

 

HOME MEDICATIONS:  Include:

1. Acetaminophen 1000 mg.

2.  Activated charcoal caps.

3.  Aspirin 81 mg.

4.  Cetirizine 10 mg.

5.  Clonidine 0.3 mg twice daily.

6.  Vitamin B12 1000 mcg daily.

7.  Docusate.

8.  Avodart 0.5 mg in the evening.

9.  Lasix 20 mg daily.

10.  Insulin sliding scale and insulin glargine 95 units.

11.  Losartan 100 mg daily.

12.  Minoxidil 5 mg daily.

13.  Saline spray.

14.  Simethicone.

 

ALLERGIES:  AMLODIPINE, ATENOLOL, ATORVASTATIN, PANTOPRAZOLE, SIMVASTATIN, 
AMOXICILLIN, ENALAPRIL, GLIPIZIDE, HYDROCHLOROTHIAZIDE, LEVOTHYROXINE, 
SPIRONOLACTONE, MONTELUKAST, BACLOFEN, ESOMEPRAZOLE, OXYBUTYNIN, PAROXETINE, 
ZOLPIDEM, and BETA BLOCKERS.

 

FAMILY HISTORY:  CAD.

 

SOCIAL HISTORY:  Former tobacco.  Quit 35 years prior, 15 pack year smoking 
history.  No alcohol.  Retired .

 

REVIEW OF SYSTEMS:  Notable for episode of shortness of breath, self-limited, 
overnight.  Sick contact 2 days prior, cough and chronic constipation; 
otherwise all other systems are negative.

 

PHYSICAL EXAM:  Vitals when seen by this author:  152/63, heart rate 105, 
respiratory rate 18, 92% to 93% on room air, T-max 98.6.  Obese man, sitting up 
in bed, interactive, no apparent distress.  He has redundant tissue over the 
neck. His oropharynx is clear.  He is Mallampati IV.  Moist mucous membranes.  
He has regular rate and rhythm.  No murmurs, rubs, or gallops.  His lungs are 
clear with trace rales in his left lower base.  His abdomen is soft, nontender.
  He has umbilical hernia.  Extremities are warm and well perfused.  He has 
trace to 1+ bilateral left lower extremity edema.  He is alert and oriented x3.

 

DIAGNOSTIC STUDIES/LAB DATA:  Labs reviewed.  White blood cell count of 7.6.  
INR is 1.  Lactic acid 1.3.  Troponin I 0.01, decreased to 0.00 on repeat 
check.  BNP 57.

 

Data reviewed.  CTA chest and thorax notable for no pulmonary arterial defects, 
just pulmonary embolism.  Mass in the upper lobe measuring 3.4 x 3.1 cm in 
size. No pleural effusions.  Airway is unremarkable.  Prevascular lymph nodes 
in the upper limits of normal in size.

 

ASSESSMENT AND PLAN:  A 72-year-old man with past medical history as outlined 
above including known lung mass; morbid obesity; history of obstructive sleep 
apnea, not on CPAP, presented with acute onset shortness of breath, resolved 
without intervention.

 

Shortness of breath.  Suspect multifactorial, potentially viral etiology in the 
setting of sick contact with his granddaughter, however, did improve with 
albuterol.  I suspect the patient has uncontrolled obstructive sleep apnea 
based on his body habitus, his prolonged expiratory phase on clinical exam.  I 
encouraged him heavily to seek repeat sleep study and CPAP trial as outpatient.
  I do not think he warrants a hospital stay.  He has no evidence of heart 
failure, pneumonia, pleural effusions, pulmonary embolism or cardiac event.  He 
has no evidence of infection at this time.  He is noted to have heart rate 
greater than 100.  I contacted his oncologist's office who saw him on 03/20/18 
and also noted his heart rate to be 107 at that time.  I suspect he has 
chronically elevated heart rate at this time in the setting of both 
uncontrolled obstructive sleep apnea as well the mass in his chest, which may 
represent a cancer.  No changes to the patient's home medications.  I recommend 
discharge at this time. Discussed with the patient's family who is in agreement 
and Dr. Davenport.  The patient will be discharged with a CD of his CT chest.  I 
also discussed with the emergency room and the patient's family.  It was 
discussed at length return to emergency room instructions including worsening 
shortness of breath or chest pain, loss of consciousness, near loss of 
consciousness or fevers.  The patient and his wife acknowledge their 
understanding thoroughly.

 

TIME SPENT:  Greater than 60 minutes was spent on discharging this patient, 
greater than half was spent face-to-face with the patient and the family.

 

 [id number]/[facility] #: [id number]

MTDD